# Patient Record
Sex: MALE | Race: WHITE | Employment: OTHER | URBAN - METROPOLITAN AREA
[De-identification: names, ages, dates, MRNs, and addresses within clinical notes are randomized per-mention and may not be internally consistent; named-entity substitution may affect disease eponyms.]

---

## 2022-01-01 ENCOUNTER — APPOINTMENT (OUTPATIENT)
Dept: GENERAL RADIOLOGY | Age: 72
DRG: 871 | End: 2022-01-01
Attending: INTERNAL MEDICINE
Payer: MEDICARE

## 2022-01-01 ENCOUNTER — APPOINTMENT (OUTPATIENT)
Dept: ULTRASOUND IMAGING | Age: 72
DRG: 871 | End: 2022-01-01
Attending: HOSPITALIST
Payer: MEDICARE

## 2022-01-01 ENCOUNTER — APPOINTMENT (OUTPATIENT)
Dept: CT IMAGING | Age: 72
DRG: 871 | End: 2022-01-01
Attending: HOSPITALIST
Payer: MEDICARE

## 2022-01-01 ENCOUNTER — APPOINTMENT (OUTPATIENT)
Dept: CT IMAGING | Age: 72
DRG: 871 | End: 2022-01-01
Attending: NURSE PRACTITIONER
Payer: MEDICARE

## 2022-01-01 ENCOUNTER — APPOINTMENT (OUTPATIENT)
Dept: GENERAL RADIOLOGY | Age: 72
DRG: 871 | End: 2022-01-01
Attending: EMERGENCY MEDICINE
Payer: MEDICARE

## 2022-01-01 ENCOUNTER — HOSPITAL ENCOUNTER (INPATIENT)
Age: 72
LOS: 7 days | DRG: 871 | End: 2022-01-16
Attending: EMERGENCY MEDICINE | Admitting: STUDENT IN AN ORGANIZED HEALTH CARE EDUCATION/TRAINING PROGRAM
Payer: MEDICARE

## 2022-01-01 ENCOUNTER — APPOINTMENT (OUTPATIENT)
Dept: GENERAL RADIOLOGY | Age: 72
DRG: 871 | End: 2022-01-01
Attending: HOSPITALIST
Payer: MEDICARE

## 2022-01-01 ENCOUNTER — APPOINTMENT (OUTPATIENT)
Dept: NON INVASIVE DIAGNOSTICS | Age: 72
DRG: 871 | End: 2022-01-01
Attending: INTERNAL MEDICINE
Payer: MEDICARE

## 2022-01-01 ENCOUNTER — APPOINTMENT (OUTPATIENT)
Dept: VASCULAR SURGERY | Age: 72
DRG: 871 | End: 2022-01-01
Attending: INTERNAL MEDICINE
Payer: MEDICARE

## 2022-01-01 VITALS
HEART RATE: 126 BPM | WEIGHT: 123.02 LBS | HEIGHT: 68 IN | BODY MASS INDEX: 18.64 KG/M2 | SYSTOLIC BLOOD PRESSURE: 89 MMHG | DIASTOLIC BLOOD PRESSURE: 60 MMHG | TEMPERATURE: 95 F | OXYGEN SATURATION: 98 % | RESPIRATION RATE: 24 BRPM

## 2022-01-01 DIAGNOSIS — N28.9 RENAL INSUFFICIENCY: ICD-10-CM

## 2022-01-01 DIAGNOSIS — G93.41 METABOLIC ENCEPHALOPATHY: ICD-10-CM

## 2022-01-01 DIAGNOSIS — A41.9 SEVERE SEPSIS (HCC): Primary | ICD-10-CM

## 2022-01-01 DIAGNOSIS — E86.0 DEHYDRATION: ICD-10-CM

## 2022-01-01 DIAGNOSIS — R65.20 SEVERE SEPSIS (HCC): Primary | ICD-10-CM

## 2022-01-01 DIAGNOSIS — E87.1 HYPONATREMIA: ICD-10-CM

## 2022-01-01 DIAGNOSIS — G93.1 ANOXIC BRAIN INJURY (HCC): ICD-10-CM

## 2022-01-01 DIAGNOSIS — R40.20 COMA, UNSPECIFIED COMA DEPTH (HCC): ICD-10-CM

## 2022-01-01 LAB
ALBUMIN SERPL-MCNC: 1.8 G/DL (ref 3.5–5)
ALBUMIN SERPL-MCNC: 1.9 G/DL (ref 3.5–5)
ALBUMIN SERPL-MCNC: 2.3 G/DL (ref 3.5–5)
ALBUMIN SERPL-MCNC: 2.6 G/DL (ref 3.5–5)
ALBUMIN SERPL-MCNC: 2.6 G/DL (ref 3.5–5)
ALBUMIN SERPL-MCNC: 2.9 G/DL (ref 3.5–5)
ALBUMIN/GLOB SERPL: 0.6 {RATIO} (ref 1.1–2.2)
ALBUMIN/GLOB SERPL: 0.7 {RATIO} (ref 1.1–2.2)
ALBUMIN/GLOB SERPL: 1.2 {RATIO} (ref 1.1–2.2)
ALBUMIN/GLOB SERPL: 1.3 {RATIO} (ref 1.1–2.2)
ALBUMIN/GLOB SERPL: 1.5 {RATIO} (ref 1.1–2.2)
ALBUMIN/GLOB SERPL: 2.2 {RATIO} (ref 1.1–2.2)
ALP SERPL-CCNC: 26 U/L (ref 45–117)
ALP SERPL-CCNC: 28 U/L (ref 45–117)
ALP SERPL-CCNC: 31 U/L (ref 45–117)
ALP SERPL-CCNC: 39 U/L (ref 45–117)
ALP SERPL-CCNC: 46 U/L (ref 45–117)
ALP SERPL-CCNC: 54 U/L (ref 45–117)
ALP SERPL-CCNC: 61 U/L (ref 45–117)
ALP SERPL-CCNC: 76 U/L (ref 45–117)
ALT SERPL-CCNC: 10 U/L (ref 12–78)
ALT SERPL-CCNC: 11 U/L (ref 12–78)
ALT SERPL-CCNC: 11 U/L (ref 12–78)
ALT SERPL-CCNC: 15 U/L (ref 12–78)
ALT SERPL-CCNC: 16 U/L (ref 12–78)
ALT SERPL-CCNC: 8 U/L (ref 12–78)
AMMONIA PLAS-SCNC: 18 UMOL/L
AMMONIA PLAS-SCNC: 44 UMOL/L
ANION GAP SERPL CALC-SCNC: 11 MMOL/L (ref 5–15)
ANION GAP SERPL CALC-SCNC: 13 MMOL/L (ref 5–15)
ANION GAP SERPL CALC-SCNC: 13 MMOL/L (ref 5–15)
ANION GAP SERPL CALC-SCNC: 17 MMOL/L (ref 5–15)
ANION GAP SERPL CALC-SCNC: 18 MMOL/L (ref 5–15)
ANION GAP SERPL CALC-SCNC: 19 MMOL/L (ref 5–15)
ANION GAP SERPL CALC-SCNC: 6 MMOL/L (ref 5–15)
ANION GAP SERPL CALC-SCNC: 7 MMOL/L (ref 5–15)
ANION GAP SERPL CALC-SCNC: 8 MMOL/L (ref 5–15)
APPEARANCE UR: ABNORMAL
APTT PPP: 66.1 SEC (ref 22.1–31)
ARTERIAL PATENCY WRIST A: ABNORMAL
ARTERIAL PATENCY WRIST A: NEGATIVE
AST SERPL-CCNC: 32 U/L (ref 15–37)
AST SERPL-CCNC: 36 U/L (ref 15–37)
AST SERPL-CCNC: 39 U/L (ref 15–37)
AST SERPL-CCNC: 44 U/L (ref 15–37)
AST SERPL-CCNC: 52 U/L (ref 15–37)
AST SERPL-CCNC: 58 U/L (ref 15–37)
AST SERPL-CCNC: 66 U/L (ref 15–37)
AST SERPL-CCNC: 69 U/L (ref 15–37)
BACTERIA SPEC CULT: ABNORMAL
BACTERIA SPEC CULT: ABNORMAL
BACTERIA SPEC CULT: NORMAL
BACTERIA URNS QL MICRO: NEGATIVE /HPF
BASE DEFICIT BLD-SCNC: 12.7 MMOL/L
BASE DEFICIT BLD-SCNC: 14.7 MMOL/L
BASE DEFICIT BLD-SCNC: 15.3 MMOL/L
BASE DEFICIT BLD-SCNC: 6.7 MMOL/L
BASE DEFICIT BLD-SCNC: 7.8 MMOL/L
BASE DEFICIT BLDA-SCNC: 16.3 MMOL/L
BASE DEFICIT BLDA-SCNC: 16.7 MMOL/L
BASE DEFICIT BLDA-SCNC: 17.2 MMOL/L
BASOPHILS # BLD: 0 K/UL (ref 0–0.1)
BASOPHILS NFR BLD: 0 % (ref 0–1)
BDY SITE: ABNORMAL
BILIRUB SERPL-MCNC: 0.4 MG/DL (ref 0.2–1)
BILIRUB SERPL-MCNC: 0.4 MG/DL (ref 0.2–1)
BILIRUB SERPL-MCNC: 0.5 MG/DL (ref 0.2–1)
BILIRUB SERPL-MCNC: 0.6 MG/DL (ref 0.2–1)
BILIRUB SERPL-MCNC: 0.6 MG/DL (ref 0.2–1)
BILIRUB SERPL-MCNC: 0.7 MG/DL (ref 0.2–1)
BILIRUB SERPL-MCNC: 0.8 MG/DL (ref 0.2–1)
BILIRUB SERPL-MCNC: 1.2 MG/DL (ref 0.2–1)
BILIRUB UR QL CFM: NEGATIVE
BUN SERPL-MCNC: 12 MG/DL (ref 6–20)
BUN SERPL-MCNC: 12 MG/DL (ref 6–20)
BUN SERPL-MCNC: 13 MG/DL (ref 6–20)
BUN SERPL-MCNC: 14 MG/DL (ref 6–20)
BUN SERPL-MCNC: 16 MG/DL (ref 6–20)
BUN SERPL-MCNC: 16 MG/DL (ref 6–20)
BUN SERPL-MCNC: 17 MG/DL (ref 6–20)
BUN SERPL-MCNC: 26 MG/DL (ref 6–20)
BUN SERPL-MCNC: 8 MG/DL (ref 6–20)
BUN SERPL-MCNC: 8 MG/DL (ref 6–20)
BUN SERPL-MCNC: 9 MG/DL (ref 6–20)
BUN/CREAT SERPL: 10 (ref 12–20)
BUN/CREAT SERPL: 11 (ref 12–20)
BUN/CREAT SERPL: 12 (ref 12–20)
BUN/CREAT SERPL: 13 (ref 12–20)
BUN/CREAT SERPL: 14 (ref 12–20)
BUN/CREAT SERPL: 19 (ref 12–20)
BUN/CREAT SERPL: 19 (ref 12–20)
BUN/CREAT SERPL: 8 (ref 12–20)
BUN/CREAT SERPL: 9 (ref 12–20)
CA-I BLD-SCNC: 1.1 MMOL/L (ref 1.13–1.32)
CA-I BLD-SCNC: 1.38 MMOL/L (ref 1.12–1.32)
CA-I BLD-SCNC: 1.41 MMOL/L (ref 1.12–1.32)
CA-I BLD-SCNC: 1.78 MMOL/L (ref 1.12–1.32)
CALCIUM SERPL-MCNC: 10.2 MG/DL (ref 8.5–10.1)
CALCIUM SERPL-MCNC: 12.4 MG/DL (ref 8.5–10.1)
CALCIUM SERPL-MCNC: 7 MG/DL (ref 8.5–10.1)
CALCIUM SERPL-MCNC: 7.3 MG/DL (ref 8.5–10.1)
CALCIUM SERPL-MCNC: 7.6 MG/DL (ref 8.5–10.1)
CALCIUM SERPL-MCNC: 7.7 MG/DL (ref 8.5–10.1)
CALCIUM SERPL-MCNC: 7.7 MG/DL (ref 8.5–10.1)
CALCIUM SERPL-MCNC: 7.9 MG/DL (ref 8.5–10.1)
CALCIUM SERPL-MCNC: 7.9 MG/DL (ref 8.5–10.1)
CALCIUM SERPL-MCNC: 8.5 MG/DL (ref 8.5–10.1)
CALCIUM SERPL-MCNC: 9.5 MG/DL (ref 8.5–10.1)
CHLORIDE SERPL-SCNC: 110 MMOL/L (ref 97–108)
CHLORIDE SERPL-SCNC: 111 MMOL/L (ref 97–108)
CHLORIDE SERPL-SCNC: 115 MMOL/L (ref 97–108)
CHLORIDE SERPL-SCNC: 116 MMOL/L (ref 97–108)
CHLORIDE SERPL-SCNC: 117 MMOL/L (ref 97–108)
CHLORIDE SERPL-SCNC: 117 MMOL/L (ref 97–108)
CHLORIDE SERPL-SCNC: 118 MMOL/L (ref 97–108)
CHLORIDE SERPL-SCNC: 119 MMOL/L (ref 97–108)
CHLORIDE SERPL-SCNC: 89 MMOL/L (ref 97–108)
CO2 SERPL-SCNC: 11 MMOL/L (ref 21–32)
CO2 SERPL-SCNC: 13 MMOL/L (ref 21–32)
CO2 SERPL-SCNC: 13 MMOL/L (ref 21–32)
CO2 SERPL-SCNC: 14 MMOL/L (ref 21–32)
CO2 SERPL-SCNC: 14 MMOL/L (ref 21–32)
CO2 SERPL-SCNC: 15 MMOL/L (ref 21–32)
CO2 SERPL-SCNC: 17 MMOL/L (ref 21–32)
CO2 SERPL-SCNC: 18 MMOL/L (ref 21–32)
CO2 SERPL-SCNC: 19 MMOL/L (ref 21–32)
CO2 SERPL-SCNC: 19 MMOL/L (ref 21–32)
CO2 SERPL-SCNC: 22 MMOL/L (ref 21–32)
COLOR UR: ABNORMAL
COMMENT, HOLDF: NORMAL
COMMENT, HOLDF: NORMAL
CORTIS SERPL-MCNC: 27.8 UG/DL
COVID-19 RAPID TEST, COVR: DETECTED
COVID-19 RAPID TEST, COVR: NOT DETECTED
CREAT SERPL-MCNC: 0.63 MG/DL (ref 0.7–1.3)
CREAT SERPL-MCNC: 0.87 MG/DL (ref 0.7–1.3)
CREAT SERPL-MCNC: 0.88 MG/DL (ref 0.7–1.3)
CREAT SERPL-MCNC: 0.92 MG/DL (ref 0.7–1.3)
CREAT SERPL-MCNC: 0.95 MG/DL (ref 0.7–1.3)
CREAT SERPL-MCNC: 0.99 MG/DL (ref 0.7–1.3)
CREAT SERPL-MCNC: 1.03 MG/DL (ref 0.7–1.3)
CREAT SERPL-MCNC: 1.15 MG/DL (ref 0.7–1.3)
CREAT SERPL-MCNC: 1.26 MG/DL (ref 0.7–1.3)
CREAT SERPL-MCNC: 1.42 MG/DL (ref 0.7–1.3)
CREAT SERPL-MCNC: 1.8 MG/DL (ref 0.7–1.3)
DIFFERENTIAL METHOD BLD: ABNORMAL
ECHO AO ROOT DIAM: 2.4 CM
ECHO AO ROOT INDEX: 1.64 CM/M2
ECHO AV AREA PEAK VELOCITY: 0.4 CM2
ECHO AV AREA VTI: 0.3 CM2
ECHO AV MEAN GRADIENT: 26 MMHG
ECHO AV MEAN VELOCITY: 2.5 M/S
ECHO AV PEAK GRADIENT: 35 MMHG
ECHO AV PEAK GRADIENT: 36 MMHG
ECHO AV PEAK VELOCITY: 3 M/S
ECHO AV PEAK VELOCITY: 3 M/S
ECHO AV VTI: 69 CM
ECHO EST RA PRESSURE: 8 MMHG
ECHO LA DIAMETER INDEX: 1.99 CM/M2
ECHO LA DIAMETER: 2.9 CM
ECHO LA TO AORTIC ROOT RATIO: 1.21
ECHO LV E' LATERAL VELOCITY: 3 CM/S
ECHO LV E' SEPTAL VELOCITY: 7 CM/S
ECHO LV FRACTIONAL SHORTENING: 13 % (ref 28–44)
ECHO LV INTERNAL DIMENSION DIASTOLE INDEX: 2.67 CM/M2
ECHO LV INTERNAL DIMENSION DIASTOLIC: 3.9 CM (ref 4.2–5.9)
ECHO LV INTERNAL DIMENSION SYSTOLIC INDEX: 2.33 CM/M2
ECHO LV INTERNAL DIMENSION SYSTOLIC: 3.4 CM
ECHO LV IVSD: 0.6 CM (ref 0.6–1)
ECHO LV MASS 2D: 68.2 G (ref 88–224)
ECHO LV MASS INDEX 2D: 46.7 G/M2 (ref 49–115)
ECHO LV POSTERIOR WALL DIASTOLIC: 0.7 CM (ref 0.6–1)
ECHO LV RELATIVE WALL THICKNESS RATIO: 0.36
ECHO LVOT AREA: 2.3 CM2
ECHO LVOT AV VTI INDEX: 0.11
ECHO LVOT DIAM: 1.7 CM
ECHO LVOT MEAN GRADIENT: 1 MMHG
ECHO LVOT PEAK GRADIENT: 1 MMHG
ECHO LVOT PEAK VELOCITY: 0.5 M/S
ECHO LVOT STROKE VOLUME INDEX: 12.3 ML/M2
ECHO LVOT SV: 17.9 ML
ECHO LVOT VTI: 7.9 CM
ECHO MV A VELOCITY: 0.61 M/S
ECHO MV AREA PHT: 3.3 CM2
ECHO MV E DECELERATION TIME (DT): 231.4 MS
ECHO MV E VELOCITY: 0.75 M/S
ECHO MV E/A RATIO: 1.23
ECHO MV E/E' LATERAL: 25
ECHO MV E/E' RATIO (AVERAGED): 17.86
ECHO MV E/E' SEPTAL: 10.71
ECHO MV PRESSURE HALF TIME (PHT): 67.1 MS
ECHO MV REGURGITANT PEAK GRADIENT: 31 MMHG
ECHO MV REGURGITANT PEAK VELOCITY: 2.8 M/S
ECHO PV MAX VELOCITY: 0.6 M/S
ECHO PV PEAK GRADIENT: 2 MMHG
ECHO RIGHT VENTRICULAR SYSTOLIC PRESSURE (RVSP): 55 MMHG
ECHO TV REGURGITANT MAX VELOCITY: 3.44 M/S
ECHO TV REGURGITANT PEAK GRADIENT: 47 MMHG
EOSINOPHIL # BLD: 0 K/UL (ref 0–0.4)
EOSINOPHIL NFR BLD: 0 % (ref 0–7)
EPITH CASTS URNS QL MICRO: ABNORMAL /LPF
ERYTHROCYTE [DISTWIDTH] IN BLOOD BY AUTOMATED COUNT: 12.3 % (ref 11.5–14.5)
ERYTHROCYTE [DISTWIDTH] IN BLOOD BY AUTOMATED COUNT: 12.5 % (ref 11.5–14.5)
ERYTHROCYTE [DISTWIDTH] IN BLOOD BY AUTOMATED COUNT: 12.5 % (ref 11.5–14.5)
ERYTHROCYTE [DISTWIDTH] IN BLOOD BY AUTOMATED COUNT: 12.6 % (ref 11.5–14.5)
ERYTHROCYTE [DISTWIDTH] IN BLOOD BY AUTOMATED COUNT: 12.8 % (ref 11.5–14.5)
ERYTHROCYTE [DISTWIDTH] IN BLOOD BY AUTOMATED COUNT: 13.1 % (ref 11.5–14.5)
ERYTHROCYTE [DISTWIDTH] IN BLOOD BY AUTOMATED COUNT: 13.2 % (ref 11.5–14.5)
ERYTHROCYTE [DISTWIDTH] IN BLOOD BY AUTOMATED COUNT: 14.1 % (ref 11.5–14.5)
ERYTHROCYTE [DISTWIDTH] IN BLOOD BY AUTOMATED COUNT: 14.8 % (ref 11.5–14.5)
ERYTHROCYTE [DISTWIDTH] IN BLOOD BY AUTOMATED COUNT: 16.1 % (ref 11.5–14.5)
FIO2 ON VENT: 100 %
FIO2 ON VENT: 100 %
FIO2 ON VENT: 80 %
FOLATE SERPL-MCNC: 6.7 NG/ML (ref 5–21)
FOLATE SERPL-MCNC: 9.6 NG/ML (ref 5–21)
GAS FLOW.O2 O2 DELIVERY SYS: ABNORMAL L/MIN
GAS FLOW.O2 SETTING OXYMISER: 16 BPM
GAS FLOW.O2 SETTING OXYMISER: 20 L/MIN
GAS FLOW.O2 SETTING OXYMISER: 24 L/MIN
GAS FLOW.O2 SETTING OXYMISER: 24 L/MIN
GLOBULIN SER CALC-MCNC: 1.2 G/DL (ref 2–4)
GLOBULIN SER CALC-MCNC: 1.5 G/DL (ref 2–4)
GLOBULIN SER CALC-MCNC: 1.5 G/DL (ref 2–4)
GLOBULIN SER CALC-MCNC: 2.1 G/DL (ref 2–4)
GLOBULIN SER CALC-MCNC: 2.6 G/DL (ref 2–4)
GLOBULIN SER CALC-MCNC: 2.6 G/DL (ref 2–4)
GLOBULIN SER CALC-MCNC: 2.7 G/DL (ref 2–4)
GLOBULIN SER CALC-MCNC: 4.7 G/DL (ref 2–4)
GLUCOSE BLD STRIP.AUTO-MCNC: 101 MG/DL (ref 65–117)
GLUCOSE BLD STRIP.AUTO-MCNC: 106 MG/DL (ref 65–117)
GLUCOSE BLD STRIP.AUTO-MCNC: 108 MG/DL (ref 65–117)
GLUCOSE BLD STRIP.AUTO-MCNC: 111 MG/DL (ref 65–117)
GLUCOSE BLD STRIP.AUTO-MCNC: 125 MG/DL (ref 65–117)
GLUCOSE BLD STRIP.AUTO-MCNC: 127 MG/DL (ref 65–117)
GLUCOSE BLD STRIP.AUTO-MCNC: 146 MG/DL (ref 65–117)
GLUCOSE BLD STRIP.AUTO-MCNC: 147 MG/DL (ref 65–117)
GLUCOSE BLD STRIP.AUTO-MCNC: 147 MG/DL (ref 65–117)
GLUCOSE BLD STRIP.AUTO-MCNC: 148 MG/DL (ref 65–117)
GLUCOSE BLD STRIP.AUTO-MCNC: 156 MG/DL (ref 65–117)
GLUCOSE BLD STRIP.AUTO-MCNC: 165 MG/DL (ref 65–117)
GLUCOSE BLD STRIP.AUTO-MCNC: 176 MG/DL (ref 65–117)
GLUCOSE BLD STRIP.AUTO-MCNC: 179 MG/DL (ref 65–117)
GLUCOSE BLD STRIP.AUTO-MCNC: 181 MG/DL (ref 65–117)
GLUCOSE BLD STRIP.AUTO-MCNC: 234 MG/DL (ref 65–117)
GLUCOSE BLD STRIP.AUTO-MCNC: 59 MG/DL (ref 65–117)
GLUCOSE BLD STRIP.AUTO-MCNC: 68 MG/DL (ref 65–117)
GLUCOSE BLD STRIP.AUTO-MCNC: 81 MG/DL (ref 65–117)
GLUCOSE SERPL-MCNC: 111 MG/DL (ref 65–100)
GLUCOSE SERPL-MCNC: 142 MG/DL (ref 65–100)
GLUCOSE SERPL-MCNC: 142 MG/DL (ref 65–100)
GLUCOSE SERPL-MCNC: 149 MG/DL (ref 65–100)
GLUCOSE SERPL-MCNC: 157 MG/DL (ref 65–100)
GLUCOSE SERPL-MCNC: 178 MG/DL (ref 65–100)
GLUCOSE SERPL-MCNC: 195 MG/DL (ref 65–100)
GLUCOSE SERPL-MCNC: 209 MG/DL (ref 65–100)
GLUCOSE SERPL-MCNC: 360 MG/DL (ref 65–100)
GLUCOSE SERPL-MCNC: 61 MG/DL (ref 65–100)
GLUCOSE SERPL-MCNC: 63 MG/DL (ref 65–100)
GLUCOSE UR STRIP.AUTO-MCNC: NEGATIVE MG/DL
GRAM STN SPEC: ABNORMAL
HCO3 BLD-SCNC: 13.2 MMOL/L (ref 22–26)
HCO3 BLD-SCNC: 13.4 MMOL/L (ref 22–26)
HCO3 BLD-SCNC: 15 MMOL/L (ref 22–26)
HCO3 BLD-SCNC: 17.5 MMOL/L (ref 22–26)
HCO3 BLD-SCNC: 18.5 MMOL/L (ref 22–26)
HCO3 BLDA-SCNC: 11 MMOL/L (ref 22–26)
HCT VFR BLD AUTO: 19.6 % (ref 36.6–50.3)
HCT VFR BLD AUTO: 19.6 % (ref 36.6–50.3)
HCT VFR BLD AUTO: 21 % (ref 36.6–50.3)
HCT VFR BLD AUTO: 23 % (ref 36.6–50.3)
HCT VFR BLD AUTO: 23 % (ref 36.6–50.3)
HCT VFR BLD AUTO: 24.5 % (ref 36.6–50.3)
HCT VFR BLD AUTO: 25.4 % (ref 36.6–50.3)
HCT VFR BLD AUTO: 25.5 % (ref 36.6–50.3)
HCT VFR BLD AUTO: 26 % (ref 36.6–50.3)
HCT VFR BLD AUTO: 26.1 % (ref 36.6–50.3)
HCT VFR BLD AUTO: 26.4 % (ref 36.6–50.3)
HCT VFR BLD AUTO: 26.9 % (ref 36.6–50.3)
HCT VFR BLD AUTO: 27.5 % (ref 36.6–50.3)
HCT VFR BLD AUTO: 33.8 % (ref 36.6–50.3)
HEMOCCULT STL QL: NEGATIVE
HGB BLD-MCNC: 11.4 G/DL (ref 12.1–17)
HGB BLD-MCNC: 6 G/DL (ref 12.1–17)
HGB BLD-MCNC: 6.2 G/DL (ref 12.1–17)
HGB BLD-MCNC: 6.4 G/DL (ref 12.1–17)
HGB BLD-MCNC: 7 G/DL (ref 12.1–17)
HGB BLD-MCNC: 7.6 G/DL (ref 12.1–17)
HGB BLD-MCNC: 7.8 G/DL (ref 12.1–17)
HGB BLD-MCNC: 7.9 G/DL (ref 12.1–17)
HGB BLD-MCNC: 8.1 G/DL (ref 12.1–17)
HGB BLD-MCNC: 8.1 G/DL (ref 12.1–17)
HGB BLD-MCNC: 8.2 G/DL (ref 12.1–17)
HGB BLD-MCNC: 8.3 G/DL (ref 12.1–17)
HGB BLD-MCNC: 8.4 G/DL (ref 12.1–17)
HGB BLD-MCNC: 8.6 G/DL (ref 12.1–17)
HGB UR QL STRIP: ABNORMAL
HISTORY CHECKED?,CKHIST: NORMAL
IMM GRANULOCYTES # BLD AUTO: 0 K/UL
IMM GRANULOCYTES # BLD AUTO: 0 K/UL
IMM GRANULOCYTES # BLD AUTO: 0.1 K/UL (ref 0–0.04)
IMM GRANULOCYTES NFR BLD AUTO: 0 %
IMM GRANULOCYTES NFR BLD AUTO: 0 %
IMM GRANULOCYTES NFR BLD AUTO: 1 % (ref 0–0.5)
IMM GRANULOCYTES NFR BLD AUTO: 2 % (ref 0–0.5)
INR PPP: 1.5 (ref 0.9–1.1)
IRON SATN MFR SERPL: 36 % (ref 20–50)
IRON SERPL-MCNC: 49 UG/DL (ref 35–150)
KETONES UR QL STRIP.AUTO: ABNORMAL MG/DL
LACTATE SERPL-SCNC: 1.2 MMOL/L (ref 0.4–2)
LACTATE SERPL-SCNC: 11.2 MMOL/L (ref 0.4–2)
LACTATE SERPL-SCNC: 15.6 MMOL/L (ref 0.4–2)
LACTATE SERPL-SCNC: 17.3 MMOL/L (ref 0.4–2)
LACTATE SERPL-SCNC: 2.4 MMOL/L (ref 0.4–2)
LACTATE SERPL-SCNC: 2.8 MMOL/L (ref 0.4–2)
LACTATE SERPL-SCNC: 5.2 MMOL/L (ref 0.4–2)
LACTATE SERPL-SCNC: 6.1 MMOL/L (ref 0.4–2)
LACTATE SERPL-SCNC: 6.3 MMOL/L (ref 0.4–2)
LACTATE SERPL-SCNC: 7.8 MMOL/L (ref 0.4–2)
LEUKOCYTE ESTERASE UR QL STRIP.AUTO: ABNORMAL
LIPASE SERPL-CCNC: <10 U/L (ref 73–393)
LYMPHOCYTES # BLD: 0.4 K/UL (ref 0.8–3.5)
LYMPHOCYTES # BLD: 0.4 K/UL (ref 0.8–3.5)
LYMPHOCYTES # BLD: 0.5 K/UL (ref 0.8–3.5)
LYMPHOCYTES # BLD: 0.6 K/UL (ref 0.8–3.5)
LYMPHOCYTES # BLD: 1.1 K/UL (ref 0.8–3.5)
LYMPHOCYTES # BLD: 1.9 K/UL (ref 0.8–3.5)
LYMPHOCYTES NFR BLD: 11 % (ref 12–49)
LYMPHOCYTES NFR BLD: 12 % (ref 12–49)
LYMPHOCYTES NFR BLD: 14 % (ref 12–49)
LYMPHOCYTES NFR BLD: 18 % (ref 12–49)
LYMPHOCYTES NFR BLD: 19 % (ref 12–49)
LYMPHOCYTES NFR BLD: 7 % (ref 12–49)
MAGNESIUM SERPL-MCNC: 1.1 MG/DL (ref 1.6–2.4)
MAGNESIUM SERPL-MCNC: 1.2 MG/DL (ref 1.6–2.4)
MAGNESIUM SERPL-MCNC: 1.4 MG/DL (ref 1.6–2.4)
MAGNESIUM SERPL-MCNC: 1.6 MG/DL (ref 1.6–2.4)
MAGNESIUM SERPL-MCNC: 1.7 MG/DL (ref 1.6–2.4)
MAGNESIUM SERPL-MCNC: 2.5 MG/DL (ref 1.6–2.4)
MCH RBC QN AUTO: 32.3 PG (ref 26–34)
MCH RBC QN AUTO: 32.7 PG (ref 26–34)
MCH RBC QN AUTO: 32.8 PG (ref 26–34)
MCH RBC QN AUTO: 32.8 PG (ref 26–34)
MCH RBC QN AUTO: 33 PG (ref 26–34)
MCH RBC QN AUTO: 33.1 PG (ref 26–34)
MCH RBC QN AUTO: 33.1 PG (ref 26–34)
MCH RBC QN AUTO: 33.2 PG (ref 26–34)
MCH RBC QN AUTO: 33.3 PG (ref 26–34)
MCH RBC QN AUTO: 33.5 PG (ref 26–34)
MCHC RBC AUTO-ENTMCNC: 30.4 G/DL (ref 30–36.5)
MCHC RBC AUTO-ENTMCNC: 30.6 G/DL (ref 30–36.5)
MCHC RBC AUTO-ENTMCNC: 31.2 G/DL (ref 30–36.5)
MCHC RBC AUTO-ENTMCNC: 31.2 G/DL (ref 30–36.5)
MCHC RBC AUTO-ENTMCNC: 31.4 G/DL (ref 30–36.5)
MCHC RBC AUTO-ENTMCNC: 31.4 G/DL (ref 30–36.5)
MCHC RBC AUTO-ENTMCNC: 31.6 G/DL (ref 30–36.5)
MCHC RBC AUTO-ENTMCNC: 31.8 G/DL (ref 30–36.5)
MCHC RBC AUTO-ENTMCNC: 33 G/DL (ref 30–36.5)
MCHC RBC AUTO-ENTMCNC: 33.7 G/DL (ref 30–36.5)
MCV RBC AUTO: 101.3 FL (ref 80–99)
MCV RBC AUTO: 103.5 FL (ref 80–99)
MCV RBC AUTO: 103.7 FL (ref 80–99)
MCV RBC AUTO: 103.9 FL (ref 80–99)
MCV RBC AUTO: 104.5 FL (ref 80–99)
MCV RBC AUTO: 105.2 FL (ref 80–99)
MCV RBC AUTO: 106.1 FL (ref 80–99)
MCV RBC AUTO: 107.1 FL (ref 80–99)
MCV RBC AUTO: 108.5 FL (ref 80–99)
MCV RBC AUTO: 98.8 FL (ref 80–99)
MONOCYTES # BLD: 0.1 K/UL (ref 0–1)
MONOCYTES # BLD: 0.2 K/UL (ref 0–1)
MONOCYTES # BLD: 0.5 K/UL (ref 0–1)
MONOCYTES # BLD: 0.5 K/UL (ref 0–1)
MONOCYTES NFR BLD: 2 % (ref 5–13)
MONOCYTES NFR BLD: 4 % (ref 5–13)
MONOCYTES NFR BLD: 6 % (ref 5–13)
MONOCYTES NFR BLD: 6 % (ref 5–13)
MONOCYTES NFR BLD: 7 % (ref 5–13)
MONOCYTES NFR BLD: 7 % (ref 5–13)
MYELOCYTES NFR BLD MANUAL: 1 %
NEUTS BAND NFR BLD MANUAL: 3 % (ref 0–6)
NEUTS SEG # BLD: 1.8 K/UL (ref 1.8–8)
NEUTS SEG # BLD: 2.6 K/UL (ref 1.8–8)
NEUTS SEG # BLD: 24.6 K/UL (ref 1.8–8)
NEUTS SEG # BLD: 3 K/UL (ref 1.8–8)
NEUTS SEG # BLD: 3.2 K/UL (ref 1.8–8)
NEUTS SEG # BLD: 6.1 K/UL (ref 1.8–8)
NEUTS SEG NFR BLD: 72 % (ref 32–75)
NEUTS SEG NFR BLD: 74 % (ref 32–75)
NEUTS SEG NFR BLD: 78 % (ref 32–75)
NEUTS SEG NFR BLD: 80 % (ref 32–75)
NEUTS SEG NFR BLD: 85 % (ref 32–75)
NEUTS SEG NFR BLD: 87 % (ref 32–75)
NITRITE UR QL STRIP.AUTO: NEGATIVE
NRBC # BLD: 0 K/UL (ref 0–0.01)
NRBC # BLD: 0.02 K/UL (ref 0–0.01)
NRBC # BLD: 0.11 K/UL (ref 0–0.01)
NRBC # BLD: 0.11 K/UL (ref 0–0.01)
NRBC # BLD: 0.14 K/UL (ref 0–0.01)
NRBC # BLD: 0.43 K/UL (ref 0–0.01)
NRBC # BLD: 0.63 K/UL (ref 0–0.01)
NRBC BLD-RTO: 0 PER 100 WBC
NRBC BLD-RTO: 0.4 PER 100 WBC
NRBC BLD-RTO: 0.6 PER 100 WBC
NRBC BLD-RTO: 0.7 PER 100 WBC
NRBC BLD-RTO: 0.9 PER 100 WBC
NRBC BLD-RTO: 1.3 PER 100 WBC
NRBC BLD-RTO: 2.3 PER 100 WBC
O2/TOTAL GAS SETTING VFR VENT: 100 %
O2/TOTAL GAS SETTING VFR VENT: 100 %
O2/TOTAL GAS SETTING VFR VENT: 60 %
PCO2 BLD: 33.6 MMHG (ref 35–45)
PCO2 BLD: 34.5 MMHG (ref 35–45)
PCO2 BLD: 39.5 MMHG (ref 35–45)
PCO2 BLD: 41.9 MMHG (ref 35–45)
PCO2 BLD: 42.2 MMHG (ref 35–45)
PCO2 BLDA: 32 MMHG (ref 35–45)
PCO2 BLDA: 34 MMHG (ref 35–45)
PCO2 BLDA: 34 MMHG (ref 35–45)
PEEP RESPIRATORY: 5 CMH2O
PEEP RESPIRATORY: 8 CMH2O
PEEP RESPIRATORY: 8 CM[H2O]
PH BLD: 7.11 [PH] (ref 7.35–7.45)
PH BLD: 7.14 [PH] (ref 7.35–7.45)
PH BLD: 7.16 [PH] (ref 7.35–7.45)
PH BLD: 7.33 [PH] (ref 7.35–7.45)
PH BLD: 7.34 [PH] (ref 7.35–7.45)
PH BLDA: 7.13 [PH] (ref 7.35–7.45)
PH BLDA: 7.15 [PH] (ref 7.35–7.45)
PH BLDA: 7.15 [PH] (ref 7.35–7.45)
PH UR STRIP: 5 [PH] (ref 5–8)
PH UR STRIP: 6 [PH] (ref 5–8)
PHOSPHATE SERPL-MCNC: 1.2 MG/DL (ref 2.6–4.7)
PHOSPHATE SERPL-MCNC: 2 MG/DL (ref 2.6–4.7)
PHOSPHATE SERPL-MCNC: 5.2 MG/DL (ref 2.6–4.7)
PLATELET # BLD AUTO: 102 K/UL (ref 150–400)
PLATELET # BLD AUTO: 147 K/UL (ref 150–400)
PLATELET # BLD AUTO: 23 K/UL (ref 150–400)
PLATELET # BLD AUTO: 36 K/UL (ref 150–400)
PLATELET # BLD AUTO: 48 K/UL (ref 150–400)
PLATELET # BLD AUTO: 65 K/UL (ref 150–400)
PLATELET # BLD AUTO: 72 K/UL (ref 150–400)
PLATELET # BLD AUTO: 80 K/UL (ref 150–400)
PLATELET # BLD AUTO: 92 K/UL (ref 150–400)
PLATELET # BLD AUTO: 92 K/UL (ref 150–400)
PLATELET COMMENTS,PCOM: ABNORMAL
PMV BLD AUTO: 11.4 FL (ref 8.9–12.9)
PMV BLD AUTO: 11.8 FL (ref 8.9–12.9)
PMV BLD AUTO: 11.9 FL (ref 8.9–12.9)
PMV BLD AUTO: 12.3 FL (ref 8.9–12.9)
PMV BLD AUTO: 12.9 FL (ref 8.9–12.9)
PO2 BLD: 127 MMHG (ref 80–100)
PO2 BLD: 465 MMHG (ref 80–100)
PO2 BLD: 54 MMHG (ref 80–100)
PO2 BLD: 62 MMHG (ref 80–100)
PO2 BLD: 86 MMHG (ref 80–100)
PO2 BLDA: 115 MMHG (ref 80–100)
PO2 BLDA: 124 MMHG (ref 80–100)
PO2 BLDA: 165 MMHG (ref 80–100)
POTASSIUM SERPL-SCNC: 2.9 MMOL/L (ref 3.5–5.1)
POTASSIUM SERPL-SCNC: 3.1 MMOL/L (ref 3.5–5.1)
POTASSIUM SERPL-SCNC: 3.6 MMOL/L (ref 3.5–5.1)
POTASSIUM SERPL-SCNC: 3.7 MMOL/L (ref 3.5–5.1)
POTASSIUM SERPL-SCNC: 4 MMOL/L (ref 3.5–5.1)
POTASSIUM SERPL-SCNC: 4.1 MMOL/L (ref 3.5–5.1)
POTASSIUM SERPL-SCNC: 4.5 MMOL/L (ref 3.5–5.1)
POTASSIUM SERPL-SCNC: 4.7 MMOL/L (ref 3.5–5.1)
POTASSIUM SERPL-SCNC: 5 MMOL/L (ref 3.5–5.1)
PROCALCITONIN SERPL-MCNC: 0.94 NG/ML
PROT SERPL-MCNC: 3.4 G/DL (ref 6.4–8.2)
PROT SERPL-MCNC: 3.8 G/DL (ref 6.4–8.2)
PROT SERPL-MCNC: 3.8 G/DL (ref 6.4–8.2)
PROT SERPL-MCNC: 4.4 G/DL (ref 6.4–8.2)
PROT SERPL-MCNC: 4.5 G/DL (ref 6.4–8.2)
PROT SERPL-MCNC: 4.6 G/DL (ref 6.4–8.2)
PROT SERPL-MCNC: 4.7 G/DL (ref 6.4–8.2)
PROT SERPL-MCNC: 7.6 G/DL (ref 6.4–8.2)
PROT UR STRIP-MCNC: ABNORMAL MG/DL
PROTHROMBIN TIME: 15.2 SEC (ref 9–11.1)
RBC # BLD AUTO: 1.83 M/UL (ref 4.1–5.7)
RBC # BLD AUTO: 1.89 M/UL (ref 4.1–5.7)
RBC # BLD AUTO: 2.12 M/UL (ref 4.1–5.7)
RBC # BLD AUTO: 2.27 M/UL (ref 4.1–5.7)
RBC # BLD AUTO: 2.44 M/UL (ref 4.1–5.7)
RBC # BLD AUTO: 2.45 M/UL (ref 4.1–5.7)
RBC # BLD AUTO: 2.48 M/UL (ref 4.1–5.7)
RBC # BLD AUTO: 2.54 M/UL (ref 4.1–5.7)
RBC # BLD AUTO: 2.6 M/UL (ref 4.1–5.7)
RBC # BLD AUTO: 3.42 M/UL (ref 4.1–5.7)
RBC #/AREA URNS HPF: ABNORMAL /HPF (ref 0–5)
RBC MORPH BLD: ABNORMAL
SAMPLES BEING HELD,HOLD: NORMAL
SAMPLES BEING HELD,HOLD: NORMAL
SAO2 % BLD: 100 % (ref 92–97)
SAO2 % BLD: 78.3 % (ref 92–97)
SAO2 % BLD: 81.6 % (ref 92–97)
SAO2 % BLD: 92.9 % (ref 92–97)
SAO2 % BLD: 97 % (ref 92–97)
SAO2 % BLD: 98 % (ref 92–97)
SAO2 % BLD: 98.7 % (ref 92–97)
SAO2 % BLD: 99 % (ref 92–97)
SAO2% DEVICE SAO2% SENSOR NAME: ABNORMAL
SARS-COV-2, COV2: NORMAL
SARS-COV-2, XPLCVT: DETECTED
SERVICE CMNT-IMP: ABNORMAL
SERVICE CMNT-IMP: NORMAL
SODIUM SERPL-SCNC: 127 MMOL/L (ref 136–145)
SODIUM SERPL-SCNC: 139 MMOL/L (ref 136–145)
SODIUM SERPL-SCNC: 139 MMOL/L (ref 136–145)
SODIUM SERPL-SCNC: 141 MMOL/L (ref 136–145)
SODIUM SERPL-SCNC: 141 MMOL/L (ref 136–145)
SODIUM SERPL-SCNC: 142 MMOL/L (ref 136–145)
SODIUM SERPL-SCNC: 142 MMOL/L (ref 136–145)
SODIUM SERPL-SCNC: 143 MMOL/L (ref 136–145)
SODIUM SERPL-SCNC: 145 MMOL/L (ref 136–145)
SOURCE, COVRS: ABNORMAL
SOURCE, COVRS: ABNORMAL
SOURCE, COVRS: NORMAL
SP GR UR REFRACTOMETRY: 1.02 (ref 1–1.03)
SPECIMEN SITE: ABNORMAL
SPECIMEN TYPE: ABNORMAL
T4 FREE SERPL-MCNC: 1 NG/DL (ref 0.8–1.5)
THERAPEUTIC RANGE,PTTT: ABNORMAL SECS (ref 58–77)
TIBC SERPL-MCNC: 137 UG/DL (ref 250–450)
TROPONIN-HIGH SENSITIVITY: 1182 NG/L (ref 0–76)
TROPONIN-HIGH SENSITIVITY: 121 NG/L (ref 0–76)
TROPONIN-HIGH SENSITIVITY: 1815 NG/L (ref 0–76)
TROPONIN-HIGH SENSITIVITY: 1955 NG/L (ref 0–76)
TSH SERPL DL<=0.05 MIU/L-ACNC: 4.19 UIU/ML (ref 0.36–3.74)
UROBILINOGEN UR QL STRIP.AUTO: 1 EU/DL (ref 0.2–1)
VANCOMYCIN SERPL-MCNC: 11.4 UG/ML
VENTILATION MODE VENT: ABNORMAL
VIT B12 SERPL-MCNC: 1030 PG/ML (ref 193–986)
VT SETTING VENT: 480 ML
WBC # BLD AUTO: 15.8 K/UL (ref 4.1–11.1)
WBC # BLD AUTO: 15.9 K/UL (ref 4.1–11.1)
WBC # BLD AUTO: 2.6 K/UL (ref 4.1–11.1)
WBC # BLD AUTO: 27 K/UL (ref 4.1–11.1)
WBC # BLD AUTO: 27.3 K/UL (ref 4.1–11.1)
WBC # BLD AUTO: 3.5 K/UL (ref 4.1–11.1)
WBC # BLD AUTO: 3.7 K/UL (ref 4.1–11.1)
WBC # BLD AUTO: 3.7 K/UL (ref 4.1–11.1)
WBC # BLD AUTO: 32.2 K/UL (ref 4.1–11.1)
WBC # BLD AUTO: 7.8 K/UL (ref 4.1–11.1)
WBC URNS QL MICRO: ABNORMAL /HPF (ref 0–4)

## 2022-01-01 PROCEDURE — 96360 HYDRATION IV INFUSION INIT: CPT

## 2022-01-01 PROCEDURE — 74011250636 HC RX REV CODE- 250/636: Performed by: INTERNAL MEDICINE

## 2022-01-01 PROCEDURE — 74011000250 HC RX REV CODE- 250: Performed by: INTERNAL MEDICINE

## 2022-01-01 PROCEDURE — 36415 COLL VENOUS BLD VENIPUNCTURE: CPT

## 2022-01-01 PROCEDURE — 84484 ASSAY OF TROPONIN QUANT: CPT

## 2022-01-01 PROCEDURE — 87070 CULTURE OTHR SPECIMN AEROBIC: CPT

## 2022-01-01 PROCEDURE — C9113 INJ PANTOPRAZOLE SODIUM, VIA: HCPCS | Performed by: INTERNAL MEDICINE

## 2022-01-01 PROCEDURE — 82962 GLUCOSE BLOOD TEST: CPT

## 2022-01-01 PROCEDURE — 83735 ASSAY OF MAGNESIUM: CPT

## 2022-01-01 PROCEDURE — 74011250636 HC RX REV CODE- 250/636

## 2022-01-01 PROCEDURE — 65660000000 HC RM CCU STEPDOWN

## 2022-01-01 PROCEDURE — 84100 ASSAY OF PHOSPHORUS: CPT

## 2022-01-01 PROCEDURE — 82140 ASSAY OF AMMONIA: CPT

## 2022-01-01 PROCEDURE — 83540 ASSAY OF IRON: CPT

## 2022-01-01 PROCEDURE — 77030040922 HC BLNKT HYPOTHRM STRY -A

## 2022-01-01 PROCEDURE — 99222 1ST HOSP IP/OBS MODERATE 55: CPT | Performed by: PSYCHIATRY & NEUROLOGY

## 2022-01-01 PROCEDURE — 74011000258 HC RX REV CODE- 258: Performed by: INTERNAL MEDICINE

## 2022-01-01 PROCEDURE — 87449 NOS EACH ORGANISM AG IA: CPT

## 2022-01-01 PROCEDURE — 71045 X-RAY EXAM CHEST 1 VIEW: CPT

## 2022-01-01 PROCEDURE — 96365 THER/PROPH/DIAG IV INF INIT: CPT

## 2022-01-01 PROCEDURE — 03HY32Z INSERTION OF MONITORING DEVICE INTO UPPER ARTERY, PERCUTANEOUS APPROACH: ICD-10-PCS | Performed by: INTERNAL MEDICINE

## 2022-01-01 PROCEDURE — 93306 TTE W/DOPPLER COMPLETE: CPT

## 2022-01-01 PROCEDURE — 80053 COMPREHEN METABOLIC PANEL: CPT

## 2022-01-01 PROCEDURE — 82803 BLOOD GASES ANY COMBINATION: CPT

## 2022-01-01 PROCEDURE — 94003 VENT MGMT INPAT SUBQ DAY: CPT

## 2022-01-01 PROCEDURE — 36430 TRANSFUSION BLD/BLD COMPNT: CPT

## 2022-01-01 PROCEDURE — 74011250637 HC RX REV CODE- 250/637: Performed by: NURSE PRACTITIONER

## 2022-01-01 PROCEDURE — 82746 ASSAY OF FOLIC ACID SERUM: CPT

## 2022-01-01 PROCEDURE — 83605 ASSAY OF LACTIC ACID: CPT

## 2022-01-01 PROCEDURE — P9047 ALBUMIN (HUMAN), 25%, 50ML: HCPCS | Performed by: INTERNAL MEDICINE

## 2022-01-01 PROCEDURE — 94640 AIRWAY INHALATION TREATMENT: CPT

## 2022-01-01 PROCEDURE — 74011250636 HC RX REV CODE- 250/636: Performed by: EMERGENCY MEDICINE

## 2022-01-01 PROCEDURE — 85018 HEMOGLOBIN: CPT

## 2022-01-01 PROCEDURE — 87040 BLOOD CULTURE FOR BACTERIA: CPT

## 2022-01-01 PROCEDURE — 74011250637 HC RX REV CODE- 250/637: Performed by: HOSPITALIST

## 2022-01-01 PROCEDURE — 87635 SARS-COV-2 COVID-19 AMP PRB: CPT

## 2022-01-01 PROCEDURE — 0BH17EZ INSERTION OF ENDOTRACHEAL AIRWAY INTO TRACHEA, VIA NATURAL OR ARTIFICIAL OPENING: ICD-10-PCS | Performed by: INTERNAL MEDICINE

## 2022-01-01 PROCEDURE — 83986 ASSAY PH BODY FLUID NOS: CPT

## 2022-01-01 PROCEDURE — 87086 URINE CULTURE/COLONY COUNT: CPT

## 2022-01-01 PROCEDURE — 80202 ASSAY OF VANCOMYCIN: CPT

## 2022-01-01 PROCEDURE — 36600 WITHDRAWAL OF ARTERIAL BLOOD: CPT

## 2022-01-01 PROCEDURE — 97530 THERAPEUTIC ACTIVITIES: CPT

## 2022-01-01 PROCEDURE — 74011000258 HC RX REV CODE- 258: Performed by: HOSPITALIST

## 2022-01-01 PROCEDURE — 74011250636 HC RX REV CODE- 250/636: Performed by: STUDENT IN AN ORGANIZED HEALTH CARE EDUCATION/TRAINING PROGRAM

## 2022-01-01 PROCEDURE — 36591 DRAW BLOOD OFF VENOUS DEVICE: CPT

## 2022-01-01 PROCEDURE — P9016 RBC LEUKOCYTES REDUCED: HCPCS

## 2022-01-01 PROCEDURE — 82272 OCCULT BLD FECES 1-3 TESTS: CPT

## 2022-01-01 PROCEDURE — 85025 COMPLETE CBC W/AUTO DIFF WBC: CPT

## 2022-01-01 PROCEDURE — 99233 SBSQ HOSP IP/OBS HIGH 50: CPT | Performed by: PSYCHIATRY & NEUROLOGY

## 2022-01-01 PROCEDURE — 86923 COMPATIBILITY TEST ELECTRIC: CPT

## 2022-01-01 PROCEDURE — 74011250636 HC RX REV CODE- 250/636: Performed by: HOSPITALIST

## 2022-01-01 PROCEDURE — 74011250637 HC RX REV CODE- 250/637: Performed by: STUDENT IN AN ORGANIZED HEALTH CARE EDUCATION/TRAINING PROGRAM

## 2022-01-01 PROCEDURE — 83690 ASSAY OF LIPASE: CPT

## 2022-01-01 PROCEDURE — 65620000000 HC RM CCU GENERAL

## 2022-01-01 PROCEDURE — 96361 HYDRATE IV INFUSION ADD-ON: CPT

## 2022-01-01 PROCEDURE — 85730 THROMBOPLASTIN TIME PARTIAL: CPT

## 2022-01-01 PROCEDURE — 65270000029 HC RM PRIVATE

## 2022-01-01 PROCEDURE — 97162 PT EVAL MOD COMPLEX 30 MIN: CPT

## 2022-01-01 PROCEDURE — 65610000003 HC RM ICU SURGICAL

## 2022-01-01 PROCEDURE — 74011250637 HC RX REV CODE- 250/637: Performed by: INTERNAL MEDICINE

## 2022-01-01 PROCEDURE — 82607 VITAMIN B-12: CPT

## 2022-01-01 PROCEDURE — 84443 ASSAY THYROID STIM HORMONE: CPT

## 2022-01-01 PROCEDURE — P9045 ALBUMIN (HUMAN), 5%, 250 ML: HCPCS

## 2022-01-01 PROCEDURE — 85027 COMPLETE CBC AUTOMATED: CPT

## 2022-01-01 PROCEDURE — 80048 BASIC METABOLIC PNL TOTAL CA: CPT

## 2022-01-01 PROCEDURE — 31500 INSERT EMERGENCY AIRWAY: CPT

## 2022-01-01 PROCEDURE — 85610 PROTHROMBIN TIME: CPT

## 2022-01-01 PROCEDURE — 02HV33Z INSERTION OF INFUSION DEVICE INTO SUPERIOR VENA CAVA, PERCUTANEOUS APPROACH: ICD-10-PCS | Performed by: INTERNAL MEDICINE

## 2022-01-01 PROCEDURE — 51798 US URINE CAPACITY MEASURE: CPT

## 2022-01-01 PROCEDURE — 86900 BLOOD TYPING SEROLOGIC ABO: CPT

## 2022-01-01 PROCEDURE — 5A1945Z RESPIRATORY VENTILATION, 24-96 CONSECUTIVE HOURS: ICD-10-PCS | Performed by: INTERNAL MEDICINE

## 2022-01-01 PROCEDURE — 77030037878 HC DRSG MEPILEX >48IN BORD MOLN -B

## 2022-01-01 PROCEDURE — 94002 VENT MGMT INPAT INIT DAY: CPT

## 2022-01-01 PROCEDURE — 82533 TOTAL CORTISOL: CPT

## 2022-01-01 PROCEDURE — U0005 INFEC AGEN DETEC AMPLI PROBE: HCPCS

## 2022-01-01 PROCEDURE — 93970 EXTREMITY STUDY: CPT

## 2022-01-01 PROCEDURE — 97166 OT EVAL MOD COMPLEX 45 MIN: CPT

## 2022-01-01 PROCEDURE — P9047 ALBUMIN (HUMAN), 25%, 50ML: HCPCS | Performed by: HOSPITALIST

## 2022-01-01 PROCEDURE — 74011000250 HC RX REV CODE- 250: Performed by: STUDENT IN AN ORGANIZED HEALTH CARE EDUCATION/TRAINING PROGRAM

## 2022-01-01 PROCEDURE — 99284 EMERGENCY DEPT VISIT MOD MDM: CPT

## 2022-01-01 PROCEDURE — 70450 CT HEAD/BRAIN W/O DYE: CPT

## 2022-01-01 PROCEDURE — 96366 THER/PROPH/DIAG IV INF ADDON: CPT

## 2022-01-01 PROCEDURE — 97535 SELF CARE MNGMENT TRAINING: CPT

## 2022-01-01 PROCEDURE — 36592 COLLECT BLOOD FROM PICC: CPT

## 2022-01-01 PROCEDURE — 74011000258 HC RX REV CODE- 258: Performed by: STUDENT IN AN ORGANIZED HEALTH CARE EDUCATION/TRAINING PROGRAM

## 2022-01-01 PROCEDURE — 74011636637 HC RX REV CODE- 636/637: Performed by: INTERNAL MEDICINE

## 2022-01-01 PROCEDURE — 74011000250 HC RX REV CODE- 250

## 2022-01-01 PROCEDURE — 84439 ASSAY OF FREE THYROXINE: CPT

## 2022-01-01 PROCEDURE — 71250 CT THORAX DX C-: CPT

## 2022-01-01 PROCEDURE — 84145 PROCALCITONIN (PCT): CPT

## 2022-01-01 PROCEDURE — 76705 ECHO EXAM OF ABDOMEN: CPT

## 2022-01-01 PROCEDURE — 81001 URINALYSIS AUTO W/SCOPE: CPT

## 2022-01-01 PROCEDURE — 74018 RADEX ABDOMEN 1 VIEW: CPT

## 2022-01-01 PROCEDURE — 74011000250 HC RX REV CODE- 250: Performed by: HOSPITALIST

## 2022-01-01 PROCEDURE — APPNB60 APP NON BILLABLE TIME 46-60 MINS: Performed by: NURSE PRACTITIONER

## 2022-01-01 RX ORDER — DEXTROSE 50 % IN WATER (D50W) INTRAVENOUS SYRINGE
12.5-25 AS NEEDED
Status: DISCONTINUED | OUTPATIENT
Start: 2022-01-01 | End: 2022-01-01

## 2022-01-01 RX ORDER — SODIUM CHLORIDE 9 MG/ML
50 INJECTION, SOLUTION INTRAVENOUS CONTINUOUS
Status: DISCONTINUED | OUTPATIENT
Start: 2022-01-01 | End: 2022-01-01

## 2022-01-01 RX ORDER — IPRATROPIUM BROMIDE AND ALBUTEROL SULFATE 2.5; .5 MG/3ML; MG/3ML
3 SOLUTION RESPIRATORY (INHALATION)
Status: DISCONTINUED | OUTPATIENT
Start: 2022-01-01 | End: 2022-01-01

## 2022-01-01 RX ORDER — SODIUM BICARBONATE 84 MG/ML
50 INJECTION, SOLUTION INTRAVENOUS ONCE
Status: COMPLETED | OUTPATIENT
Start: 2022-01-01 | End: 2022-01-01

## 2022-01-01 RX ORDER — CHLORDIAZEPOXIDE HYDROCHLORIDE 25 MG/1
25 CAPSULE, GELATIN COATED ORAL 3 TIMES DAILY
Status: DISCONTINUED | OUTPATIENT
Start: 2022-01-01 | End: 2022-01-01

## 2022-01-01 RX ORDER — HYDROMORPHONE HYDROCHLORIDE 1 MG/ML
1 INJECTION, SOLUTION INTRAMUSCULAR; INTRAVENOUS; SUBCUTANEOUS
Status: DISCONTINUED | OUTPATIENT
Start: 2022-01-01 | End: 2022-01-01

## 2022-01-01 RX ORDER — LANOLIN ALCOHOL/MO/W.PET/CERES
1 CREAM (GRAM) TOPICAL 2 TIMES DAILY
COMMUNITY
Start: 2021-01-15

## 2022-01-01 RX ORDER — CHLORDIAZEPOXIDE HYDROCHLORIDE 5 MG/1
10 CAPSULE, GELATIN COATED ORAL 3 TIMES DAILY
Status: DISCONTINUED | OUTPATIENT
Start: 2022-01-01 | End: 2022-01-01

## 2022-01-01 RX ORDER — SODIUM CHLORIDE 9 MG/ML
250 INJECTION, SOLUTION INTRAVENOUS AS NEEDED
Status: DISCONTINUED | OUTPATIENT
Start: 2022-01-01 | End: 2022-01-01

## 2022-01-01 RX ORDER — SODIUM BICARBONATE IN D5W 150/1000ML
PLASTIC BAG, INJECTION (ML) INTRAVENOUS CONTINUOUS
Status: DISCONTINUED | OUTPATIENT
Start: 2022-01-01 | End: 2022-01-01

## 2022-01-01 RX ORDER — MAGNESIUM SULFATE HEPTAHYDRATE 40 MG/ML
2 INJECTION, SOLUTION INTRAVENOUS ONCE
Status: COMPLETED | OUTPATIENT
Start: 2022-01-01 | End: 2022-01-01

## 2022-01-01 RX ORDER — MIDODRINE HYDROCHLORIDE 5 MG/1
10 TABLET ORAL
Status: DISCONTINUED | OUTPATIENT
Start: 2022-01-01 | End: 2022-01-01

## 2022-01-01 RX ORDER — SODIUM CHLORIDE 0.9 % (FLUSH) 0.9 %
10 SYRINGE (ML) INJECTION EVERY 12 HOURS
Status: DISCONTINUED | OUTPATIENT
Start: 2022-01-01 | End: 2022-01-01

## 2022-01-01 RX ORDER — ASCORBIC ACID 500 MG
500 TABLET ORAL 2 TIMES DAILY
Status: DISCONTINUED | OUTPATIENT
Start: 2022-01-01 | End: 2022-01-01

## 2022-01-01 RX ORDER — POTASSIUM CHLORIDE 750 MG/1
40 TABLET, FILM COATED, EXTENDED RELEASE ORAL
Status: COMPLETED | OUTPATIENT
Start: 2022-01-01 | End: 2022-01-01

## 2022-01-01 RX ORDER — CHLORHEXIDINE GLUCONATE 1.2 MG/ML
15 RINSE ORAL EVERY 12 HOURS
Status: DISCONTINUED | OUTPATIENT
Start: 2022-01-01 | End: 2022-01-01

## 2022-01-01 RX ORDER — MELATONIN
1000 DAILY
Status: DISCONTINUED | OUTPATIENT
Start: 2022-01-01 | End: 2022-01-01

## 2022-01-01 RX ORDER — ATENOLOL 50 MG/1
50 TABLET ORAL DAILY
COMMUNITY
Start: 2021-01-01

## 2022-01-01 RX ORDER — DEXTROSE, SODIUM CHLORIDE, AND POTASSIUM CHLORIDE 5; .9; .15 G/100ML; G/100ML; G/100ML
75 INJECTION INTRAVENOUS CONTINUOUS
Status: DISCONTINUED | OUTPATIENT
Start: 2022-01-01 | End: 2022-01-01

## 2022-01-01 RX ORDER — LANOLIN ALCOHOL/MO/W.PET/CERES
100 CREAM (GRAM) TOPICAL DAILY
Status: DISCONTINUED | OUTPATIENT
Start: 2022-01-01 | End: 2022-01-01

## 2022-01-01 RX ORDER — BUDESONIDE 0.5 MG/2ML
500 INHALANT ORAL
Status: DISCONTINUED | OUTPATIENT
Start: 2022-01-01 | End: 2022-01-01

## 2022-01-01 RX ORDER — POTASSIUM CHLORIDE 750 MG/1
20 TABLET, FILM COATED, EXTENDED RELEASE ORAL 2 TIMES DAILY
Status: DISCONTINUED | OUTPATIENT
Start: 2022-01-01 | End: 2022-01-01

## 2022-01-01 RX ORDER — ENOXAPARIN SODIUM 100 MG/ML
40 INJECTION SUBCUTANEOUS EVERY 24 HOURS
Status: DISCONTINUED | OUTPATIENT
Start: 2022-01-01 | End: 2022-01-01

## 2022-01-01 RX ORDER — LEVOFLOXACIN 5 MG/ML
750 INJECTION, SOLUTION INTRAVENOUS ONCE
Status: COMPLETED | OUTPATIENT
Start: 2022-01-01 | End: 2022-01-01

## 2022-01-01 RX ORDER — DEXTROSE MONOHYDRATE AND SODIUM CHLORIDE 5; .9 G/100ML; G/100ML
100 INJECTION, SOLUTION INTRAVENOUS CONTINUOUS
Status: DISCONTINUED | OUTPATIENT
Start: 2022-01-01 | End: 2022-01-01

## 2022-01-01 RX ORDER — POTASSIUM CHLORIDE 29.8 MG/ML
20 INJECTION INTRAVENOUS ONCE
Status: COMPLETED | OUTPATIENT
Start: 2022-01-01 | End: 2022-01-01

## 2022-01-01 RX ORDER — GUAIFENESIN 600 MG/1
600 TABLET, EXTENDED RELEASE ORAL EVERY 12 HOURS
Status: DISCONTINUED | OUTPATIENT
Start: 2022-01-01 | End: 2022-01-01

## 2022-01-01 RX ORDER — THERA TABS 400 MCG
1 TAB ORAL DAILY
Status: DISCONTINUED | OUTPATIENT
Start: 2022-01-01 | End: 2022-01-01

## 2022-01-01 RX ORDER — HEPARIN SODIUM 5000 [USP'U]/ML
5000 INJECTION, SOLUTION INTRAVENOUS; SUBCUTANEOUS EVERY 8 HOURS
Status: DISCONTINUED | OUTPATIENT
Start: 2022-01-01 | End: 2022-01-01

## 2022-01-01 RX ORDER — LEVOFLOXACIN 5 MG/ML
250 INJECTION, SOLUTION INTRAVENOUS EVERY 24 HOURS
Status: COMPLETED | OUTPATIENT
Start: 2022-01-01 | End: 2022-01-01

## 2022-01-01 RX ORDER — SODIUM BICARBONATE 84 MG/ML
100 INJECTION, SOLUTION INTRAVENOUS ONCE
Status: COMPLETED | OUTPATIENT
Start: 2022-01-01 | End: 2022-01-01

## 2022-01-01 RX ORDER — CALCIUM GLUCONATE 20 MG/ML
2 INJECTION, SOLUTION INTRAVENOUS ONCE
Status: COMPLETED | OUTPATIENT
Start: 2022-01-01 | End: 2022-01-01

## 2022-01-01 RX ORDER — SODIUM BICARBONATE 84 MG/ML
INJECTION, SOLUTION INTRAVENOUS
Status: COMPLETED
Start: 2022-01-01 | End: 2022-01-01

## 2022-01-01 RX ORDER — INSULIN LISPRO 100 [IU]/ML
INJECTION, SOLUTION INTRAVENOUS; SUBCUTANEOUS
Status: DISCONTINUED | OUTPATIENT
Start: 2022-01-01 | End: 2022-01-01

## 2022-01-01 RX ORDER — LORAZEPAM 2 MG/ML
2 INJECTION INTRAMUSCULAR
Status: DISCONTINUED | OUTPATIENT
Start: 2022-01-01 | End: 2022-01-01

## 2022-01-01 RX ORDER — DEXMEDETOMIDINE HYDROCHLORIDE 4 UG/ML
.1-1.5 INJECTION, SOLUTION INTRAVENOUS
Status: DISCONTINUED | OUTPATIENT
Start: 2022-01-01 | End: 2022-01-01

## 2022-01-01 RX ORDER — CLOPIDOGREL BISULFATE 75 MG/1
75 TABLET ORAL DAILY
COMMUNITY
Start: 2021-01-01

## 2022-01-01 RX ORDER — BALSAM PERU/CASTOR OIL
OINTMENT (GRAM) TOPICAL 3 TIMES DAILY
Status: DISCONTINUED | OUTPATIENT
Start: 2022-01-01 | End: 2022-01-01

## 2022-01-01 RX ORDER — NOREPINEPHRINE BITARTRATE/D5W 8 MG/250ML
.5-3 PLASTIC BAG, INJECTION (ML) INTRAVENOUS
Status: DISCONTINUED | OUTPATIENT
Start: 2022-01-01 | End: 2022-01-01 | Stop reason: SDUPTHER

## 2022-01-01 RX ORDER — ARFORMOTEROL TARTRATE 15 UG/2ML
15 SOLUTION RESPIRATORY (INHALATION)
Status: DISCONTINUED | OUTPATIENT
Start: 2022-01-01 | End: 2022-01-01

## 2022-01-01 RX ORDER — CYANOCOBALAMIN (VITAMIN B-12) 500 MCG
400 TABLET ORAL DAILY
Status: DISCONTINUED | OUTPATIENT
Start: 2022-01-01 | End: 2022-01-01

## 2022-01-01 RX ORDER — ACETAMINOPHEN 325 MG/1
650 TABLET ORAL
Status: DISCONTINUED | OUTPATIENT
Start: 2022-01-01 | End: 2022-01-01

## 2022-01-01 RX ORDER — MAGNESIUM SULFATE 100 %
4 CRYSTALS MISCELLANEOUS AS NEEDED
Status: DISCONTINUED | OUTPATIENT
Start: 2022-01-01 | End: 2022-01-01

## 2022-01-01 RX ORDER — DOXAZOSIN 2 MG/1
2 TABLET ORAL DAILY
Status: DISCONTINUED | OUTPATIENT
Start: 2022-01-01 | End: 2022-01-01

## 2022-01-01 RX ORDER — MORPHINE SULFATE 4 MG/ML
4 INJECTION INTRAVENOUS ONCE
Status: COMPLETED | OUTPATIENT
Start: 2022-01-01 | End: 2022-01-01

## 2022-01-01 RX ORDER — PANTOPRAZOLE SODIUM 40 MG/10ML
40 INJECTION, POWDER, LYOPHILIZED, FOR SOLUTION INTRAVENOUS EVERY 12 HOURS
Status: DISCONTINUED | OUTPATIENT
Start: 2022-01-01 | End: 2022-01-01

## 2022-01-01 RX ORDER — HYDROCORTISONE SODIUM SUCCINATE 100 MG/2ML
50 INJECTION, POWDER, FOR SOLUTION INTRAMUSCULAR; INTRAVENOUS EVERY 6 HOURS
Status: DISCONTINUED | OUTPATIENT
Start: 2022-01-01 | End: 2022-01-01

## 2022-01-01 RX ORDER — INSULIN LISPRO 100 [IU]/ML
INJECTION, SOLUTION INTRAVENOUS; SUBCUTANEOUS EVERY 6 HOURS
Status: DISCONTINUED | OUTPATIENT
Start: 2022-01-01 | End: 2022-01-01

## 2022-01-01 RX ORDER — SODIUM BICARBONATE 650 MG/1
650 TABLET ORAL 3 TIMES DAILY
Status: DISCONTINUED | OUTPATIENT
Start: 2022-01-01 | End: 2022-01-01

## 2022-01-01 RX ORDER — FUROSEMIDE 10 MG/ML
60 INJECTION INTRAMUSCULAR; INTRAVENOUS 2 TIMES DAILY
Status: DISCONTINUED | OUTPATIENT
Start: 2022-01-01 | End: 2022-01-01

## 2022-01-01 RX ORDER — ALBUMIN HUMAN 50 G/1000ML
SOLUTION INTRAVENOUS
Status: COMPLETED
Start: 2022-01-01 | End: 2022-01-01

## 2022-01-01 RX ORDER — BALSAM PERU/CASTOR OIL
OINTMENT (GRAM) TOPICAL 2 TIMES DAILY
Status: DISCONTINUED | OUTPATIENT
Start: 2022-01-01 | End: 2022-01-01

## 2022-01-01 RX ORDER — ALBUMIN HUMAN 250 G/1000ML
25 SOLUTION INTRAVENOUS EVERY 6 HOURS
Status: DISCONTINUED | OUTPATIENT
Start: 2022-01-01 | End: 2022-01-01

## 2022-01-01 RX ORDER — LEVOFLOXACIN 5 MG/ML
750 INJECTION, SOLUTION INTRAVENOUS
Status: DISCONTINUED | OUTPATIENT
Start: 2022-01-01 | End: 2022-01-01

## 2022-01-01 RX ORDER — ALBUMIN HUMAN 50 G/1000ML
25 SOLUTION INTRAVENOUS ONCE
Status: COMPLETED | OUTPATIENT
Start: 2022-01-01 | End: 2022-01-01

## 2022-01-01 RX ORDER — FOLIC ACID 1 MG/1
1 TABLET ORAL DAILY
Status: DISCONTINUED | OUTPATIENT
Start: 2022-01-01 | End: 2022-01-01

## 2022-01-01 RX ORDER — LORAZEPAM 2 MG/ML
4 INJECTION INTRAMUSCULAR
Status: DISCONTINUED | OUTPATIENT
Start: 2022-01-01 | End: 2022-01-01

## 2022-01-01 RX ORDER — SODIUM BICARBONATE 1 MEQ/ML
100 SYRINGE (ML) INTRAVENOUS ONCE
Status: DISCONTINUED | OUTPATIENT
Start: 2022-01-01 | End: 2022-01-01 | Stop reason: CLARIF

## 2022-01-01 RX ORDER — ENOXAPARIN SODIUM 100 MG/ML
30 INJECTION SUBCUTANEOUS EVERY 24 HOURS
Status: DISCONTINUED | OUTPATIENT
Start: 2022-01-01 | End: 2022-01-01

## 2022-01-01 RX ORDER — ALBUMIN HUMAN 250 G/1000ML
25 SOLUTION INTRAVENOUS EVERY 6 HOURS
Status: DISPENSED | OUTPATIENT
Start: 2022-01-01 | End: 2022-01-01

## 2022-01-01 RX ADMIN — ALBUMIN (HUMAN) 25 G: 0.25 INJECTION, SOLUTION INTRAVENOUS at 00:02

## 2022-01-01 RX ADMIN — SODIUM BICARBONATE 650 MG: 650 TABLET ORAL at 09:25

## 2022-01-01 RX ADMIN — VANCOMYCIN HYDROCHLORIDE 500 MG: 500 INJECTION, POWDER, LYOPHILIZED, FOR SOLUTION INTRAVENOUS at 21:03

## 2022-01-01 RX ADMIN — POTASSIUM CHLORIDE 20 MEQ: 750 TABLET, FILM COATED, EXTENDED RELEASE ORAL at 08:26

## 2022-01-01 RX ADMIN — Medication: at 09:25

## 2022-01-01 RX ADMIN — SODIUM CHLORIDE 500 ML: 9 INJECTION, SOLUTION INTRAVENOUS at 14:08

## 2022-01-01 RX ADMIN — CALCIUM GLUCONATE 2 G: 20 INJECTION, SOLUTION INTRAVENOUS at 13:45

## 2022-01-01 RX ADMIN — NOREPINEPHRINE BITARTRATE 30 MCG/MIN: 1 INJECTION, SOLUTION, CONCENTRATE INTRAVENOUS at 23:38

## 2022-01-01 RX ADMIN — SODIUM CHLORIDE, PRESERVATIVE FREE 10 ML: 5 INJECTION INTRAVENOUS at 21:39

## 2022-01-01 RX ADMIN — CHOLECALCIFEROL (VITAMIN D3) 10 MCG (400 UNIT) TABLET 400 UNITS: at 08:31

## 2022-01-01 RX ADMIN — POTASSIUM CHLORIDE, DEXTROSE MONOHYDRATE AND SODIUM CHLORIDE 75 ML/HR: 150; 5; 900 INJECTION, SOLUTION INTRAVENOUS at 05:06

## 2022-01-01 RX ADMIN — CHLORHEXIDINE GLUCONATE 15 ML: 1.2 RINSE ORAL at 08:32

## 2022-01-01 RX ADMIN — DEXTROSE AND SODIUM CHLORIDE 100 ML/HR: 5; 900 INJECTION, SOLUTION INTRAVENOUS at 01:31

## 2022-01-01 RX ADMIN — HYDROCORTISONE SODIUM SUCCINATE 50 MG: 100 INJECTION, POWDER, FOR SOLUTION INTRAMUSCULAR; INTRAVENOUS at 00:28

## 2022-01-01 RX ADMIN — BUDESONIDE 500 MCG: 0.5 INHALANT RESPIRATORY (INHALATION) at 07:36

## 2022-01-01 RX ADMIN — CHOLECALCIFEROL (VITAMIN D3) 10 MCG (400 UNIT) TABLET 400 UNITS: at 08:26

## 2022-01-01 RX ADMIN — HYDROCORTISONE SODIUM SUCCINATE 50 MG: 100 INJECTION, POWDER, FOR SOLUTION INTRAMUSCULAR; INTRAVENOUS at 18:43

## 2022-01-01 RX ADMIN — SODIUM PHOSPHATE, MONOBASIC, MONOHYDRATE: 276; 142 INJECTION, SOLUTION INTRAVENOUS at 23:04

## 2022-01-01 RX ADMIN — POTASSIUM CHLORIDE 40 MEQ: 750 TABLET, FILM COATED, EXTENDED RELEASE ORAL at 09:52

## 2022-01-01 RX ADMIN — AZTREONAM 2 G: 2 INJECTION, POWDER, LYOPHILIZED, FOR SOLUTION INTRAMUSCULAR; INTRAVENOUS at 21:03

## 2022-01-01 RX ADMIN — FUROSEMIDE 60 MG: 10 INJECTION, SOLUTION INTRAVENOUS at 12:41

## 2022-01-01 RX ADMIN — DOXAZOSIN 2 MG: 2 TABLET ORAL at 09:25

## 2022-01-01 RX ADMIN — NOREPINEPHRINE BITARTRATE 30 MCG/MIN: 1 INJECTION, SOLUTION, CONCENTRATE INTRAVENOUS at 03:30

## 2022-01-01 RX ADMIN — SODIUM CHLORIDE, PRESERVATIVE FREE 10 ML: 5 INJECTION INTRAVENOUS at 08:30

## 2022-01-01 RX ADMIN — SODIUM BICARBONATE: 84 INJECTION, SOLUTION INTRAVENOUS at 01:03

## 2022-01-01 RX ADMIN — PHENYLEPHRINE HYDROCHLORIDE 190 MCG/MIN: 10 INJECTION INTRAVENOUS at 14:45

## 2022-01-01 RX ADMIN — ARFORMOTEROL TARTRATE 15 MCG: 15 SOLUTION RESPIRATORY (INHALATION) at 20:18

## 2022-01-01 RX ADMIN — SODIUM CHLORIDE 1000 ML: 9 INJECTION, SOLUTION INTRAVENOUS at 15:15

## 2022-01-01 RX ADMIN — VASOPRESSIN 0.04 UNITS/MIN: 20 INJECTION INTRAVENOUS at 21:28

## 2022-01-01 RX ADMIN — SODIUM CHLORIDE, PRESERVATIVE FREE 10 ML: 5 INJECTION INTRAVENOUS at 20:21

## 2022-01-01 RX ADMIN — VASOPRESSIN 0.04 UNITS/MIN: 20 INJECTION INTRAVENOUS at 14:39

## 2022-01-01 RX ADMIN — HYDROCORTISONE SODIUM SUCCINATE 50 MG: 100 INJECTION, POWDER, FOR SOLUTION INTRAMUSCULAR; INTRAVENOUS at 18:02

## 2022-01-01 RX ADMIN — PHENYLEPHRINE HYDROCHLORIDE 270 MCG/MIN: 10 INJECTION INTRAVENOUS at 06:27

## 2022-01-01 RX ADMIN — ALBUMIN (HUMAN) 25 G: 0.25 INJECTION, SOLUTION INTRAVENOUS at 11:34

## 2022-01-01 RX ADMIN — EPINEPHRINE 6 MCG/MIN: 1 INJECTION INTRAMUSCULAR; INTRAVENOUS; SUBCUTANEOUS at 20:00

## 2022-01-01 RX ADMIN — PANTOPRAZOLE SODIUM 40 MG: 40 INJECTION, POWDER, FOR SOLUTION INTRAVENOUS at 20:57

## 2022-01-01 RX ADMIN — DOXAZOSIN 2 MG: 2 TABLET ORAL at 09:12

## 2022-01-01 RX ADMIN — SODIUM BICARBONATE 650 MG: 650 TABLET ORAL at 08:32

## 2022-01-01 RX ADMIN — SODIUM BICARBONATE 650 MG: 650 TABLET ORAL at 21:48

## 2022-01-01 RX ADMIN — VANCOMYCIN HYDROCHLORIDE 500 MG: 500 INJECTION, POWDER, LYOPHILIZED, FOR SOLUTION INTRAVENOUS at 08:33

## 2022-01-01 RX ADMIN — Medication: at 09:00

## 2022-01-01 RX ADMIN — GUAIFENESIN 600 MG: 600 TABLET, EXTENDED RELEASE ORAL at 20:58

## 2022-01-01 RX ADMIN — PHENYLEPHRINE HYDROCHLORIDE 100 MCG/MIN: 10 INJECTION INTRAVENOUS at 23:10

## 2022-01-01 RX ADMIN — SODIUM CHLORIDE 125 ML/HR: 9 INJECTION, SOLUTION INTRAVENOUS at 08:25

## 2022-01-01 RX ADMIN — Medication: at 22:51

## 2022-01-01 RX ADMIN — Medication 30 MCG/MIN: at 00:31

## 2022-01-01 RX ADMIN — Medication: at 17:09

## 2022-01-01 RX ADMIN — CHLORHEXIDINE GLUCONATE 15 ML: 1.2 RINSE ORAL at 21:05

## 2022-01-01 RX ADMIN — VASOPRESSIN 0.04 UNITS/MIN: 20 INJECTION INTRAVENOUS at 00:41

## 2022-01-01 RX ADMIN — POTASSIUM CHLORIDE 20 MEQ: 750 TABLET, FILM COATED, EXTENDED RELEASE ORAL at 08:32

## 2022-01-01 RX ADMIN — VASOPRESSIN 0.04 UNITS/MIN: 20 INJECTION INTRAVENOUS at 20:21

## 2022-01-01 RX ADMIN — AZTREONAM 2 G: 2 INJECTION, POWDER, LYOPHILIZED, FOR SOLUTION INTRAMUSCULAR; INTRAVENOUS at 14:50

## 2022-01-01 RX ADMIN — BUDESONIDE 500 MCG: 0.5 INHALANT RESPIRATORY (INHALATION) at 08:13

## 2022-01-01 RX ADMIN — DOXAZOSIN 2 MG: 2 TABLET ORAL at 08:31

## 2022-01-01 RX ADMIN — SODIUM BICARBONATE 650 MG: 650 TABLET ORAL at 08:26

## 2022-01-01 RX ADMIN — PANTOPRAZOLE SODIUM 40 MG: 40 INJECTION, POWDER, FOR SOLUTION INTRAVENOUS at 09:24

## 2022-01-01 RX ADMIN — PHENYLEPHRINE HYDROCHLORIDE 160 MCG/MIN: 10 INJECTION INTRAVENOUS at 11:55

## 2022-01-01 RX ADMIN — NOREPINEPHRINE BITARTRATE 30 MCG/MIN: 1 INJECTION, SOLUTION, CONCENTRATE INTRAVENOUS at 09:35

## 2022-01-01 RX ADMIN — PANTOPRAZOLE SODIUM 40 MG: 40 INJECTION, POWDER, FOR SOLUTION INTRAVENOUS at 09:05

## 2022-01-01 RX ADMIN — PHENYLEPHRINE HYDROCHLORIDE 125 MCG/MIN: 10 INJECTION INTRAVENOUS at 03:29

## 2022-01-01 RX ADMIN — SODIUM BICARBONATE: 84 INJECTION, SOLUTION INTRAVENOUS at 06:39

## 2022-01-01 RX ADMIN — VANCOMYCIN HYDROCHLORIDE 500 MG: 500 INJECTION, POWDER, LYOPHILIZED, FOR SOLUTION INTRAVENOUS at 08:26

## 2022-01-01 RX ADMIN — PHENYLEPHRINE HYDROCHLORIDE 130 MCG/MIN: 10 INJECTION INTRAVENOUS at 05:53

## 2022-01-01 RX ADMIN — VANCOMYCIN HYDROCHLORIDE 500 MG: 500 INJECTION, POWDER, LYOPHILIZED, FOR SOLUTION INTRAVENOUS at 20:13

## 2022-01-01 RX ADMIN — Medication 100 MG: at 08:31

## 2022-01-01 RX ADMIN — AZTREONAM 2 G: 2 INJECTION, POWDER, LYOPHILIZED, FOR SOLUTION INTRAMUSCULAR; INTRAVENOUS at 04:59

## 2022-01-01 RX ADMIN — Medication: at 09:11

## 2022-01-01 RX ADMIN — PANTOPRAZOLE SODIUM 40 MG: 40 INJECTION, POWDER, FOR SOLUTION INTRAVENOUS at 20:20

## 2022-01-01 RX ADMIN — FOLIC ACID 1 MG: 1 TABLET ORAL at 08:31

## 2022-01-01 RX ADMIN — ALBUMIN HUMAN 25 G: 50 SOLUTION INTRAVENOUS at 19:45

## 2022-01-01 RX ADMIN — MAGNESIUM SULFATE HEPTAHYDRATE 2 G: 40 INJECTION, SOLUTION INTRAVENOUS at 22:30

## 2022-01-01 RX ADMIN — ALBUMIN (HUMAN) 25 G: 0.25 INJECTION, SOLUTION INTRAVENOUS at 00:28

## 2022-01-01 RX ADMIN — AZTREONAM 2 G: 2 INJECTION, POWDER, LYOPHILIZED, FOR SOLUTION INTRAMUSCULAR; INTRAVENOUS at 22:13

## 2022-01-01 RX ADMIN — ENOXAPARIN SODIUM 40 MG: 100 INJECTION SUBCUTANEOUS at 08:32

## 2022-01-01 RX ADMIN — PHENYLEPHRINE HYDROCHLORIDE 130 MCG/MIN: 10 INJECTION INTRAVENOUS at 07:47

## 2022-01-01 RX ADMIN — THIAMINE HYDROCHLORIDE 500 MG: 100 INJECTION, SOLUTION INTRAMUSCULAR; INTRAVENOUS at 09:37

## 2022-01-01 RX ADMIN — Medication 50 MCG/HR: at 22:17

## 2022-01-01 RX ADMIN — SODIUM CHLORIDE 1000 ML: 9 INJECTION, SOLUTION INTRAVENOUS at 19:09

## 2022-01-01 RX ADMIN — SODIUM CHLORIDE, PRESERVATIVE FREE 10 ML: 5 INJECTION INTRAVENOUS at 08:26

## 2022-01-01 RX ADMIN — THIAMINE HYDROCHLORIDE 500 MG: 100 INJECTION, SOLUTION INTRAMUSCULAR; INTRAVENOUS at 16:18

## 2022-01-01 RX ADMIN — PHYTONADIONE 10 MG: 10 INJECTION, EMULSION INTRAMUSCULAR; INTRAVENOUS; SUBCUTANEOUS at 09:35

## 2022-01-01 RX ADMIN — CHOLECALCIFEROL (VITAMIN D3) 10 MCG (400 UNIT) TABLET 400 UNITS: at 11:23

## 2022-01-01 RX ADMIN — PANTOPRAZOLE SODIUM 40 MG: 40 INJECTION, POWDER, FOR SOLUTION INTRAVENOUS at 08:26

## 2022-01-01 RX ADMIN — ALBUMIN (HUMAN) 25 G: 0.25 INJECTION, SOLUTION INTRAVENOUS at 17:57

## 2022-01-01 RX ADMIN — Medication: at 16:09

## 2022-01-01 RX ADMIN — SODIUM BICARBONATE 650 MG: 650 TABLET ORAL at 20:20

## 2022-01-01 RX ADMIN — SODIUM CHLORIDE, PRESERVATIVE FREE 10 ML: 5 INJECTION INTRAVENOUS at 20:57

## 2022-01-01 RX ADMIN — NOREPINEPHRINE BITARTRATE 30 MCG/MIN: 1 INJECTION, SOLUTION, CONCENTRATE INTRAVENOUS at 18:33

## 2022-01-01 RX ADMIN — VASOPRESSIN 0.04 UNITS/MIN: 20 INJECTION INTRAVENOUS at 03:43

## 2022-01-01 RX ADMIN — PHENYLEPHRINE HYDROCHLORIDE 210 MCG/MIN: 10 INJECTION INTRAVENOUS at 22:50

## 2022-01-01 RX ADMIN — SODIUM CHLORIDE, PRESERVATIVE FREE 10 ML: 5 INJECTION INTRAVENOUS at 12:30

## 2022-01-01 RX ADMIN — LEVOFLOXACIN 250 MG: 5 INJECTION, SOLUTION INTRAVENOUS at 20:54

## 2022-01-01 RX ADMIN — SODIUM CHLORIDE, PRESERVATIVE FREE 10 ML: 5 INJECTION INTRAVENOUS at 09:05

## 2022-01-01 RX ADMIN — VASOPRESSIN 0.04 UNITS/MIN: 20 INJECTION INTRAVENOUS at 12:33

## 2022-01-01 RX ADMIN — HEPARIN SODIUM 5000 UNITS: 5000 INJECTION INTRAVENOUS; SUBCUTANEOUS at 22:30

## 2022-01-01 RX ADMIN — Medication: at 08:34

## 2022-01-01 RX ADMIN — ACETAMINOPHEN 650 MG: 325 TABLET ORAL at 04:57

## 2022-01-01 RX ADMIN — DEXTROSE AND SODIUM CHLORIDE 100 ML/HR: 5; 900 INJECTION, SOLUTION INTRAVENOUS at 15:18

## 2022-01-01 RX ADMIN — LEVOFLOXACIN 250 MG: 5 INJECTION, SOLUTION INTRAVENOUS at 23:04

## 2022-01-01 RX ADMIN — SODIUM BICARBONATE 650 MG: 650 TABLET ORAL at 15:46

## 2022-01-01 RX ADMIN — LEVOFLOXACIN 250 MG: 5 INJECTION, SOLUTION INTRAVENOUS at 21:03

## 2022-01-01 RX ADMIN — MORPHINE SULFATE 4 MG: 4 INJECTION INTRAVENOUS at 11:47

## 2022-01-01 RX ADMIN — PHENYLEPHRINE HYDROCHLORIDE 30 MCG/MIN: 10 INJECTION INTRAVENOUS at 17:32

## 2022-01-01 RX ADMIN — SODIUM CHLORIDE, PRESERVATIVE FREE 10 ML: 5 INJECTION INTRAVENOUS at 21:00

## 2022-01-01 RX ADMIN — SODIUM BICARBONATE 100 MEQ: 84 INJECTION, SOLUTION INTRAVENOUS at 05:46

## 2022-01-01 RX ADMIN — VANCOMYCIN HYDROCHLORIDE 1000 MG: 1 INJECTION, POWDER, LYOPHILIZED, FOR SOLUTION INTRAVENOUS at 20:19

## 2022-01-01 RX ADMIN — CHOLECALCIFEROL (VITAMIN D3) 10 MCG (400 UNIT) TABLET 400 UNITS: at 09:25

## 2022-01-01 RX ADMIN — VANCOMYCIN HYDROCHLORIDE 500 MG: 500 INJECTION, POWDER, LYOPHILIZED, FOR SOLUTION INTRAVENOUS at 08:58

## 2022-01-01 RX ADMIN — LEVOFLOXACIN 750 MG: 750 INJECTION, SOLUTION INTRAVENOUS at 21:19

## 2022-01-01 RX ADMIN — SODIUM CHLORIDE 1000 ML: 9 INJECTION, SOLUTION INTRAVENOUS at 00:42

## 2022-01-01 RX ADMIN — NOREPINEPHRINE BITARTRATE 30 MCG/MIN: 1 INJECTION, SOLUTION, CONCENTRATE INTRAVENOUS at 04:46

## 2022-01-01 RX ADMIN — SODIUM BICARBONATE 650 MG: 650 TABLET ORAL at 21:02

## 2022-01-01 RX ADMIN — BUDESONIDE 500 MCG: 0.5 INHALANT RESPIRATORY (INHALATION) at 20:53

## 2022-01-01 RX ADMIN — ALBUMIN (HUMAN) 25 G: 0.25 INJECTION, SOLUTION INTRAVENOUS at 05:00

## 2022-01-01 RX ADMIN — THIAMINE HYDROCHLORIDE 500 MG: 100 INJECTION, SOLUTION INTRAMUSCULAR; INTRAVENOUS at 00:51

## 2022-01-01 RX ADMIN — ENOXAPARIN SODIUM 30 MG: 100 INJECTION SUBCUTANEOUS at 09:11

## 2022-01-01 RX ADMIN — THIAMINE HYDROCHLORIDE 500 MG: 100 INJECTION, SOLUTION INTRAMUSCULAR; INTRAVENOUS at 02:07

## 2022-01-01 RX ADMIN — BUDESONIDE 500 MCG: 0.5 INHALANT RESPIRATORY (INHALATION) at 20:18

## 2022-01-01 RX ADMIN — ARFORMOTEROL TARTRATE 15 MCG: 15 SOLUTION RESPIRATORY (INHALATION) at 19:22

## 2022-01-01 RX ADMIN — CHLORHEXIDINE GLUCONATE 15 ML: 1.2 RINSE ORAL at 08:29

## 2022-01-01 RX ADMIN — GUAIFENESIN 600 MG: 600 TABLET, EXTENDED RELEASE ORAL at 13:21

## 2022-01-01 RX ADMIN — NOREPINEPHRINE BITARTRATE 22 MCG/MIN: 1 INJECTION, SOLUTION, CONCENTRATE INTRAVENOUS at 10:00

## 2022-01-01 RX ADMIN — SODIUM BICARBONATE 650 MG: 650 TABLET ORAL at 18:27

## 2022-01-01 RX ADMIN — CHLORHEXIDINE GLUCONATE 15 ML: 1.2 RINSE ORAL at 22:12

## 2022-01-01 RX ADMIN — CALCIUM GLUCONATE 3 G: 98 INJECTION, SOLUTION INTRAVENOUS at 04:59

## 2022-01-01 RX ADMIN — AZTREONAM 2 G: 2 INJECTION, POWDER, LYOPHILIZED, FOR SOLUTION INTRAMUSCULAR; INTRAVENOUS at 20:57

## 2022-01-01 RX ADMIN — PANTOPRAZOLE SODIUM 40 MG: 40 INJECTION, POWDER, FOR SOLUTION INTRAVENOUS at 20:58

## 2022-01-01 RX ADMIN — VASOPRESSIN 0.04 UNITS/MIN: 20 INJECTION INTRAVENOUS at 08:51

## 2022-01-01 RX ADMIN — POTASSIUM CHLORIDE 20 MEQ: 29.8 INJECTION, SOLUTION INTRAVENOUS at 23:32

## 2022-01-01 RX ADMIN — SODIUM BICARBONATE 50 MEQ: 84 INJECTION, SOLUTION INTRAVENOUS at 12:27

## 2022-01-01 RX ADMIN — DEXMEDETOMIDINE HYDROCHLORIDE 1 MCG/KG/HR: 400 INJECTION INTRAVENOUS at 20:22

## 2022-01-01 RX ADMIN — THIAMINE HYDROCHLORIDE 500 MG: 100 INJECTION, SOLUTION INTRAMUSCULAR; INTRAVENOUS at 10:00

## 2022-01-01 RX ADMIN — POTASSIUM CHLORIDE 20 MEQ: 750 TABLET, FILM COATED, EXTENDED RELEASE ORAL at 18:27

## 2022-01-01 RX ADMIN — DEXTROSE MONOHYDRATE 12.5 G: 25 INJECTION, SOLUTION INTRAVENOUS at 12:18

## 2022-01-01 RX ADMIN — SODIUM CHLORIDE 125 ML/HR: 9 INJECTION, SOLUTION INTRAVENOUS at 23:16

## 2022-01-01 RX ADMIN — EPINEPHRINE 1 MCG/MIN: 1 INJECTION INTRAMUSCULAR; INTRAVENOUS; SUBCUTANEOUS at 12:23

## 2022-01-01 RX ADMIN — AZTREONAM 2 G: 2 INJECTION, POWDER, LYOPHILIZED, FOR SOLUTION INTRAMUSCULAR; INTRAVENOUS at 05:47

## 2022-01-01 RX ADMIN — HYDROCORTISONE SODIUM SUCCINATE 50 MG: 100 INJECTION, POWDER, FOR SOLUTION INTRAMUSCULAR; INTRAVENOUS at 11:07

## 2022-01-01 RX ADMIN — Medication: at 09:04

## 2022-01-01 RX ADMIN — THIAMINE HYDROCHLORIDE 100 MG: 100 INJECTION, SOLUTION INTRAMUSCULAR; INTRAVENOUS at 09:28

## 2022-01-01 RX ADMIN — POTASSIUM BICARBONATE 20 MEQ: 782 TABLET, EFFERVESCENT ORAL at 18:04

## 2022-01-01 RX ADMIN — CHLORDIAZEPOXIDE HYDROCHLORIDE 25 MG: 25 CAPSULE ORAL at 11:23

## 2022-01-01 RX ADMIN — AZTREONAM 2 G: 2 INJECTION, POWDER, LYOPHILIZED, FOR SOLUTION INTRAMUSCULAR; INTRAVENOUS at 04:46

## 2022-01-01 RX ADMIN — ALBUMIN (HUMAN) 25 G: 0.25 INJECTION, SOLUTION INTRAVENOUS at 12:21

## 2022-01-01 RX ADMIN — ARFORMOTEROL TARTRATE 15 MCG: 15 SOLUTION RESPIRATORY (INHALATION) at 08:13

## 2022-01-01 RX ADMIN — PHYTONADIONE 10 MG: 10 INJECTION, EMULSION INTRAMUSCULAR; INTRAVENOUS; SUBCUTANEOUS at 08:55

## 2022-01-01 RX ADMIN — POTASSIUM CHLORIDE, DEXTROSE MONOHYDRATE AND SODIUM CHLORIDE 75 ML/HR: 150; 5; 900 INJECTION, SOLUTION INTRAVENOUS at 16:00

## 2022-01-01 RX ADMIN — IPRATROPIUM BROMIDE AND ALBUTEROL SULFATE 3 ML: .5; 3 SOLUTION RESPIRATORY (INHALATION) at 16:19

## 2022-01-01 RX ADMIN — CHLORHEXIDINE GLUCONATE 15 ML: 1.2 RINSE ORAL at 20:59

## 2022-01-01 RX ADMIN — ARFORMOTEROL TARTRATE 15 MCG: 15 SOLUTION RESPIRATORY (INHALATION) at 07:36

## 2022-01-01 RX ADMIN — NOREPINEPHRINE BITARTRATE 30 MCG/MIN: 1 INJECTION, SOLUTION, CONCENTRATE INTRAVENOUS at 20:31

## 2022-01-01 RX ADMIN — LEVOFLOXACIN 250 MG: 5 INJECTION, SOLUTION INTRAVENOUS at 20:58

## 2022-01-01 RX ADMIN — HYDROCORTISONE SODIUM SUCCINATE 50 MG: 100 INJECTION, POWDER, FOR SOLUTION INTRAMUSCULAR; INTRAVENOUS at 00:46

## 2022-01-01 RX ADMIN — Medication 100 MG: at 09:11

## 2022-01-01 RX ADMIN — SODIUM BICARBONATE 650 MG: 650 TABLET ORAL at 11:00

## 2022-01-01 RX ADMIN — DEXTROSE MONOHYDRATE 25 G: 25 INJECTION, SOLUTION INTRAVENOUS at 00:45

## 2022-01-01 RX ADMIN — MAGNESIUM SULFATE HEPTAHYDRATE 2 G: 40 INJECTION, SOLUTION INTRAVENOUS at 08:34

## 2022-01-01 RX ADMIN — POTASSIUM CHLORIDE 20 MEQ: 750 TABLET, FILM COATED, EXTENDED RELEASE ORAL at 09:24

## 2022-01-01 RX ADMIN — VASOPRESSIN 0.04 UNITS/MIN: 20 INJECTION INTRAVENOUS at 04:59

## 2022-01-01 RX ADMIN — SODIUM BICARBONATE 650 MG: 650 TABLET ORAL at 15:03

## 2022-01-01 RX ADMIN — CHLORDIAZEPOXIDE HYDROCHLORIDE 25 MG: 25 CAPSULE ORAL at 15:13

## 2022-01-01 RX ADMIN — DEXMEDETOMIDINE HYDROCHLORIDE 0.5 MCG/KG/HR: 400 INJECTION INTRAVENOUS at 12:43

## 2022-01-01 RX ADMIN — SODIUM CHLORIDE, PRESERVATIVE FREE 10 ML: 5 INJECTION INTRAVENOUS at 09:24

## 2022-01-01 RX ADMIN — SODIUM BICARBONATE: 84 INJECTION, SOLUTION INTRAVENOUS at 15:18

## 2022-01-01 RX ADMIN — FOLIC ACID 1 MG: 1 TABLET ORAL at 09:12

## 2022-01-01 RX ADMIN — CHLORHEXIDINE GLUCONATE 15 ML: 1.2 RINSE ORAL at 09:05

## 2022-01-01 RX ADMIN — NOREPINEPHRINE BITARTRATE 30 MCG/MIN: 1 INJECTION, SOLUTION, CONCENTRATE INTRAVENOUS at 14:15

## 2022-01-01 RX ADMIN — Medication 50 MCG/HR: at 20:43

## 2022-01-01 RX ADMIN — BUDESONIDE 500 MCG: 0.5 INHALANT RESPIRATORY (INHALATION) at 19:23

## 2022-01-01 RX ADMIN — ALBUMIN (HUMAN) 25 G: 12.5 INJECTION, SOLUTION INTRAVENOUS at 19:45

## 2022-01-01 RX ADMIN — PANTOPRAZOLE SODIUM 40 MG: 40 INJECTION, POWDER, FOR SOLUTION INTRAVENOUS at 12:30

## 2022-01-01 RX ADMIN — POTASSIUM CHLORIDE 20 MEQ: 750 TABLET, FILM COATED, EXTENDED RELEASE ORAL at 17:08

## 2022-01-01 RX ADMIN — Medication: at 08:32

## 2022-01-01 RX ADMIN — ENOXAPARIN SODIUM 30 MG: 100 INJECTION SUBCUTANEOUS at 09:34

## 2022-01-01 RX ADMIN — ALBUMIN (HUMAN) 25 G: 0.25 INJECTION, SOLUTION INTRAVENOUS at 05:55

## 2022-01-01 RX ADMIN — PANTOPRAZOLE SODIUM 40 MG: 40 INJECTION, POWDER, FOR SOLUTION INTRAVENOUS at 21:39

## 2022-01-01 RX ADMIN — THIAMINE HYDROCHLORIDE 500 MG: 100 INJECTION, SOLUTION INTRAMUSCULAR; INTRAVENOUS at 17:45

## 2022-01-01 RX ADMIN — LEVOFLOXACIN 250 MG: 5 INJECTION, SOLUTION INTRAVENOUS at 21:39

## 2022-01-01 RX ADMIN — AZTREONAM 2 G: 2 INJECTION, POWDER, LYOPHILIZED, FOR SOLUTION INTRAMUSCULAR; INTRAVENOUS at 12:20

## 2022-01-01 RX ADMIN — HYDROCORTISONE SODIUM SUCCINATE 50 MG: 100 INJECTION, POWDER, FOR SOLUTION INTRAMUSCULAR; INTRAVENOUS at 05:00

## 2022-01-01 RX ADMIN — SODIUM BICARBONATE 650 MG: 650 TABLET ORAL at 20:54

## 2022-01-01 RX ADMIN — ARFORMOTEROL TARTRATE 15 MCG: 15 SOLUTION RESPIRATORY (INHALATION) at 20:53

## 2022-01-01 RX ADMIN — PANTOPRAZOLE SODIUM 40 MG: 40 INJECTION, POWDER, FOR SOLUTION INTRAVENOUS at 08:30

## 2022-01-01 RX ADMIN — NOREPINEPHRINE BITARTRATE 30 MCG/MIN: 1 INJECTION, SOLUTION, CONCENTRATE INTRAVENOUS at 23:39

## 2022-01-01 RX ADMIN — MIDODRINE HYDROCHLORIDE 10 MG: 5 TABLET ORAL at 09:35

## 2022-01-01 RX ADMIN — DOXAZOSIN 2 MG: 2 TABLET ORAL at 14:08

## 2022-01-01 RX ADMIN — Medication 2 UNITS: at 23:31

## 2022-01-01 RX ADMIN — POTASSIUM CHLORIDE 20 MEQ: 29.8 INJECTION, SOLUTION INTRAVENOUS at 12:27

## 2022-01-01 RX ADMIN — POTASSIUM BICARBONATE 20 MEQ: 782 TABLET, EFFERVESCENT ORAL at 12:12

## 2022-01-01 RX ADMIN — DEXMEDETOMIDINE HYDROCHLORIDE 1 MCG/KG/HR: 400 INJECTION INTRAVENOUS at 04:15

## 2022-01-01 RX ADMIN — FOLIC ACID 1 MG: 1 TABLET ORAL at 09:24

## 2022-01-01 RX ADMIN — GUAIFENESIN 600 MG: 600 TABLET, EXTENDED RELEASE ORAL at 20:20

## 2022-01-01 RX ADMIN — MAGNESIUM SULFATE HEPTAHYDRATE 2 G: 40 INJECTION, SOLUTION INTRAVENOUS at 09:53

## 2022-01-01 RX ADMIN — Medication: at 18:28

## 2022-01-09 PROBLEM — A41.9 SEVERE SEPSIS (HCC): Status: ACTIVE | Noted: 2022-01-01

## 2022-01-09 PROBLEM — R65.20 SEVERE SEPSIS (HCC): Status: ACTIVE | Noted: 2022-01-01

## 2022-01-09 NOTE — ED TRIAGE NOTES
Triage: pt developed a UTI on [place] Day and was seen by [address]. Pt was given oral antibiotics to go home with but did not take any. Pt's sons noticed pt had increased confusion several days ago went they went over to refuel the generator. Today pt was more confused, staring at the wall and sent a \"gibberish text message. \" Denies fever at this time.

## 2022-01-10 NOTE — PROGRESS NOTES
Physical Therapy    Order acknowledged, chart reviewed, noted bedrest order. PT evaluation deferred per RN recommendation as pt not able to tolerate participation this date, continues to receive fluids for BP stabilization. Will con't to follow.     Zahra Colon, PT, MPT

## 2022-01-10 NOTE — ED NOTES
TRANSFER - OUT REPORT:    Verbal report given to Lupe Verma RN (name) on Ab Snider  being transferred to [facility] (unit) for routine progression of care       Report consisted of patients Situation, Background, Assessment and   Recommendations(SBAR). Information from the following report(s) SBAR, ED Summary and MAR was reviewed with the receiving nurse. Lines:   Peripheral IV 01/09/22 Right Antecubital (Active)   Site Assessment Clean, dry, & intact 01/09/22 2054   Phlebitis Assessment 0 01/09/22 2054   Infiltration Assessment 0 01/09/22 2054   Dressing Status Clean, dry, & intact 01/09/22 2054   Hub Color/Line Status Infusing 01/09/22 2054       Peripheral IV 44/39/57 Right Cephalic (Active)        Opportunity for questions and clarification was provided.       Patient transported with:  ALEXIA

## 2022-01-10 NOTE — H&P
Brief:    Admitted loopy in setting of incompletely treated UTI, now for cont rx in hospital setting. Lactic acid normalizes, ivf/bolus/ivf cont and Levaquin dosed by pharmacy on gong, cutl urine/blood neg ;pending to date  Wbc nl and pt afeb, alert appropriate non focal and asymptomatic. Has chronic obstructive uropathy  ( intol to [place]om[place])   Nydia Gilbert MD 1/10/2022           [facility]  History and Physical    Primary Care Provider: Josette Kemp MD  Date of Service:  1/11/2022    Chief Complaint: loopy     Subjective:     Anola Pain is a 80 y.o. male who presents with Admitted loopy in setting of incompletely treated UTI, now for cont rx in hospital setting. Lactic acid normalizes, ivf/bolus/ivf cont and Levaquin dosed by pharmacy on gong, cutl urine/blood neg ;pending to date  Wbc nl and pt afeb, alert appropriate non focal and asymptomatic. Has chronic obstructive uropathy  ( intol to [place])       O nset of symptoms was gradual with gradually worsening course since that time. The pain is located - no pain . Patient describes the pain as na  and rated as na . Pain has been associated with na. Patient denies na. Symptoms are aggravated by na. Symptoms improve withna. Previous studies include na  Pt notes drinking 2-3 coctails a day, CIWA started, thiamin. Pt denies h/o or current tremors/dt's . Review of Systems:    A comprehensive review of systems was negative except for that written in the History of Present Illness. Past Medical History:   Diagnosis Date    ETOH abuse     Hypertension     UTI (urinary tract infection)       Past Surgical History:   Procedure Laterality Date    HX ORTHOPAEDIC      LTHR    HX ORTHOPAEDIC      x3 fused neck vertebre    HX OTHER SURGICAL      R carotid and L thigh vessel cleaning     Prior to Admission medications    Medication Sig Start Date End Date Taking?  Authorizing Provider   clopidogreL (PLAVIX) 75 mg tab Take 75 mg by mouth daily. 3/3/21  Yes Other, MD Joseph   ferrous sulfate 325 mg (65 mg iron) tablet Take 1 Tablet by mouth two (2) times a day. 1/15/21  Yes Other, MD Joseph   atenoloL (TENORMIN) 50 mg tablet Take 50 mg by mouth daily. 10/11/21   Other, MD Joseph     Allergies   Allergen Reactions    Keflex [Cephalexin] Anaphylaxis      History reviewed. No pertinent family history. SOCIAL HISTORY:  Social History     Socioeconomic History    Marital status:      Spouse name: Not on file    Number of children: Not on file    Years of education: Not on file    Highest education level: Not on file   Occupational History    Not on file   Tobacco Use    Smoking status: Current Every Day Smoker    Smokeless tobacco: Not on file   Substance and Sexual Activity    Alcohol use: Yes    Drug use: Never    Sexual activity: Not on file   Other Topics Concern    Not on file   Social History Narrative    Not on file     Social Determinants of Health     Financial Resource Strain:     Difficulty of Paying Living Expenses: Not on file   Food Insecurity:     Worried About Running Out of Food in the Last Year: Not on file    Lakhwinder of Food in the Last Year: Not on file   Transportation Needs:     Lack of Transportation (Medical): Not on file    Lack of Transportation (Non-Medical):  Not on file   Physical Activity:     Days of Exercise per Week: Not on file    Minutes of Exercise per Session: Not on file   Stress:     Feeling of Stress : Not on file   Social Connections:     Frequency of Communication with Friends and Family: Not on file    Frequency of Social Gatherings with Friends and Family: Not on file    Attends Advent Services: Not on file    Active Member of Clubs or Organizations: Not on file    Attends Club or Organization Meetings: Not on file    Marital Status: Not on file   Intimate Partner Violence:     Fear of Current or Ex-Partner: Not on file    Emotionally Abused: Not on file  Physically Abused: Not on file    Sexually Abused: Not on file   Housing Stability:     Unable to Pay for Housing in the Last Year: Not on file    Number of Places Lived in the Last Year: Not on file    Unstable Housing in the Last Year: Not on file           Objective:       Physical Exam:   Visit Vitals  BP (!) 144/85 (BP Patient Position: Supine)   Pulse (!) 112   Temp 98.1 °F (36.7 °C)   Resp 23   Ht 5' 8.5\" (1.74 m)   Wt 46.2 kg (101 lb 13.6 oz)   SpO2 97%   BMI 15.26 kg/m²     General:  Alert, cooperative, no distress, appears stated age. Head:  Normocephalic, without obvious abnormality, atraumatic. Eyes:  Conjunctivae/corneas clear. PERRL, EOMs intact. Ears:  Normal   external ear  both ears. Nose: Nares normal. Septum midline. Mucosa normal. No drainage or sinus tenderness. Throat: Lips, mucosa, and tongue normal.     Neck: Supple, symmetrical, trachea midline, no adenopathy, thyroid: no enlargement/tenderness/nodules, no carotid bruit and no JVD. Back:   Symmetric, no curvature. ROM normal. No CVA tenderness. Lungs:   Clear to auscultation bilaterally. Chest wall:  No tenderness or deformity. Heart:  Regular rate and rhythm, S1, S2 normal, no murmur, click, rub or gallop. Abdomen:   Soft, non-tender. Bowel sounds normal. No masses,  No organomegaly. Extremities: Extremities normal, atraumatic, no cyanosis or edema. Pulses: 1+ and symmetric all extremities. Skin: Skin color, texture, turgor normal. No rashes or lesions   Lymph nodes: Cervical, supraclavicular, and axillary nodes normal.   Neurologic: CNII-XII intact. Normal strength, sensation and reflexes throughout. Cap refill: Brisk, less than 3 seconds  Pulses: 1+, symmetric in all extremities  Skin: Warm, dry, without rashes or lesions     Data Review: All diagnostic labs and studies have been reviewed. Chest x-ray was negative for infiltrate, effusion, pneumothorax, or wide mediastinum.     Assessment: Active Problems:    Severe sepsis ([facility] [address] (1/9/2022)      UTI   Met acidosis  Alcohol - excess use   Plan:     1. Abx. Ivf/monitor, f/u lab. Cult   2. Thiamin, CIWA  3. PT/OT as can miguel   4. Replace electrolytes. Mag  5. F/u on bicab       FUNCTIONAL STATUS PRIOR TO HOSPITALIZATION Ambulates Independently (including history of recent falls):       Signed By: Yanet Pop MD     January 11, 2022

## 2022-01-10 NOTE — PROGRESS NOTES
Lovenox Monitoring  Indication: DVT Prophylaxis  Recent Labs     01/09/22 1921   HGB 11.4*   *   CREA 1.80*     Current Weight: 40.7 kg  Est. CrCl = 22 ml/min  Current Dose: 40 mg subcutaneously every 24 hours. Plan: Change to 30mg SQ every 24 hours  per [facility] P&T Committee Protocol with respect to renal function. Pharmacy will continue to monitor patient daily and will make dosage adjustments based upon changing renal function.

## 2022-01-10 NOTE — ED NOTES
Per bedside report pt was cathed x2 to obtain urine sampled and was unsuccessful. MD was made aware.

## 2022-01-10 NOTE — PROGRESS NOTES
Occupational Therapy:    Chart reviewed and attempted to see for OT session, however per RN patient likely not able to tolerate session, with labile BP receiving fluids, and active bedrest orders. Will continue to follow as appropriate.     Joanne Can, OT

## 2022-01-10 NOTE — ROUTINE PROCESS
Pt with poor urine output despite fluids. Bladder scanned pt x2. Less than 180. ED attempted to put in de la rosa unsuccessfully- couldn't advance catheter. Pt states he pees only 3x day- this is his normal. Urine is dark altaf. Blood pressure normalized. Held afternoon midodrine.  Updated son a bedside- awaiting MD

## 2022-01-10 NOTE — ED PROVIDER NOTES
72-year-old male presents from home via private vehicle accompanied by his son with a complaint of confusion. The son states that the patient was diagnosed with a UTI on [place] [facility].  He was treated with antibiotics and fluids and discharged home from the ED. He may have taken a couple days worth of the antibiotics but then stopped. Over the past couple of days the patient has seemed more confused. Today he was pretty lethargic and was not getting out of bed until 1:00 in the afternoon. He was also confusing his sons and sending text messages that were gibberish which are unusual behaviors for him. There is no known fever at home. Is not had any vomiting at the patient son is aware of. He has been having diarrhea and was soiled with stool. Patient not expressed any complaints at this time. Past Medical History:   Diagnosis Date    ETOH abuse     Hypertension     UTI (urinary tract infection)        Past Surgical History:   Procedure Laterality Date    HX ORTHOPAEDIC      LTHR    HX ORTHOPAEDIC      x3 fused neck vertebre    HX OTHER SURGICAL      R carotid and L thigh vessel cleaning         History reviewed. No pertinent family history. Social History     Socioeconomic History    Marital status: Not on file     Spouse name: Not on file    Number of children: Not on file    Years of education: Not on file    Highest education level: Not on file   Occupational History    Not on file   Tobacco Use    Smoking status: Current Every Day Smoker    Smokeless tobacco: Not on file   Substance and Sexual Activity    Alcohol use:  Yes    Drug use: Never    Sexual activity: Not on file   Other Topics Concern    Not on file   Social History Narrative    Not on file     Social Determinants of Health     Financial Resource Strain:     Difficulty of Paying Living Expenses: Not on file   Food Insecurity:     Worried About [address] in the Last Year: Not on file    Ran Out of Food in the Last Year: Not on file   Transportation Needs:     Lack of Transportation (Medical): Not on file    Lack of Transportation (Non-Medical): Not on file   Physical Activity:     Days of Exercise per Week: Not on file    Minutes of Exercise per Session: Not on file   Stress:     Feeling of Stress : Not on file   Social Connections:     Frequency of Communication with Friends and Family: Not on file    Frequency of Social Gatherings with Friends and Family: Not on file    Attends Lutheran Services: Not on file    Active Member of [address] [place] [address] [employer] or Organizations: Not on file    Attends Club or Organization Meetings: Not on file    Marital Status: Not on file   Intimate Partner Violence:     Fear of Current or Ex-Partner: Not on file    Emotionally Abused: Not on file    Physically Abused: Not on file    Sexually Abused: Not on file   Housing Stability:     Unable to Pay for Housing in the Last Year: Not on file    Number of [place] in the Last Year: Not on file    Unstable Housing in the Last Year: Not on file         ALLERGIES: Keflex [cephalexin]    Review of Systems       Vitals:    01/09/22 1858   BP: (!) 147/89   Pulse: (!) 118   Resp: 16   Temp: 97.5 °F (36.4 °C)   SpO2: 94%   Weight: 41.2 kg (90 lb 13.3 oz)            Physical Exam  Vitals and nursing note reviewed. Constitutional:       General: He is in acute distress. Appearance: He is well-developed. He is ill-appearing. Comments: Malodorous and covered in stool   HENT:      Head: Normocephalic and atraumatic. Eyes:      Pupils: Pupils are equal, round, and reactive to light. Cardiovascular:      Rate and Rhythm: Tachycardia present. Pulmonary:      Effort: Pulmonary effort is normal. No respiratory distress. Abdominal:      General: There is no distension. Musculoskeletal:         General: Normal range of motion. Cervical back: Normal range of motion and neck supple.    Skin: General: Skin is warm and dry. Neurological:      Mental Status: He is alert and oriented to person, place, and time. MDM  Number of Diagnoses or Management Options     Amount and/or Complexity of Data Reviewed  Clinical lab tests: reviewed      ED Course as of 01/12/22 0953   Mon Yehuda 10, 2022   0039 Contacted by nursing due to hospitalist not responding and patient BP at 80systolic. Second bolus of normal saline ordered. [[place]]      ED Course User Index  [NY] Hue Ortega MD     Code Sepsis Reassessment & Plan    - Sepsis order set entered: YES  - Broad Spectrum Antibiotics given:  Levofloxacin  - Repeat lactic acid ordered for time 2017  - Re-assessment performed at time 2017 and clinical condition stable. - Actions taken: abx ordered. - Hypotension or Lactic Acidosis present (SBP<90, MAP<65, Lactate >4): NO   - IVF: Not indicated due to septic shock not present  - Persistent Septic Shock present (Hypotension despite IVF resuscitation): NO  Vasopressors: Not indicated due to septic shock not present  - Disposition: Admit to Step down     8:36 PM  Patient proving difficult IV access. He had 2 peripheral IVs the blue. I just placed a 20-gauge under ultrasound guidance and hopefully this will allow us to start infusing IV fluids so we can get a urine sample. Procedures    8:36 PM  IV ACCESS WITH ULTRASOUND GUIDANCE  Will Tomas MD gained IV access using  20 gauge needle. Indication is vascular access could not be obtained. The site was cleansed with an alcohol prep, the L antecubital vein was localized with ultrasound guidance in an anterior approach. Line confirmation was obtained by direct visualization and good blood return. No anaesthetic was used. The line was successfully flushed with normal saline and was secured with transparent tape. The patient tolerated the procedure well. There were no complications.       9:24 PM  IV ACCESS WITH ULTRASOUND GUIDANCE  Will Tomas MD gained IV access using  20 gauge needle. Indication is vascular access could not be obtained. The site was cleansed with an alcohol prep, the L antecubital vein was localized with ultrasound guidance in an anterior approach. Line confirmation was obtained by direct visualization and good blood return. No anaesthetic was used. The line was successfully flushed with normal saline and was secured with transparent tape. The patient tolerated the procedure well. There were no complications. Perfect Serve Consult for Admission  10:00 PM    ED Room Number: SER06/06  Patient Name and age:  Spike Sizer 80 y.o.  male  Working Diagnosis:   1. Severe sepsis (HCC)    2. Dehydration    3. Renal insufficiency    4. Hyponatremia        COVID-19 Suspicion:  no  Sepsis present:  yes  Reassessment needed: no  Code Status:  Full Code  Readmission: no  Isolation Requirements:  no  Recommended Level of Care:  telemetry  Department:  [facility] - [phone]    Other:  Septic with tachycardia, elevated cr, lactate=2.8. COVID neg. Likely UTI but have been unable to obtain urine. Will continue to try. Ceftriaxone and IVF ordered. Total critical care time spent exclusive of procedures:  45 minutes.

## 2022-01-10 NOTE — PROGRESS NOTES
NANCY- Home . Reason for Admission:   //confusion                    RUR Score:  12%                PCP: First and Last name:   Wilfredo Reynoso MD     Name of Practice:    Are you a current patient: Yes/No:    Approximate date of last visit:    Can you participate in a virtual visit if needed:     Do you (patient/family) have any concerns for transition/discharge? No                   Plan for utilizing home health:   TBD    Current Advanced Directive/Advance Care Plan:  Full Code      Healthcare Decision Maker:   Click here to complete [address] including selection of the Healthcare Decision Maker Relationship (ie \"Primary\")              Transition of Care Plan:      CM met with pt and his son at pt's bedside. CM introduced him to the role of CM and transition of care. Pt lives with a roommate in a one story home with no steps to enter. He has a ramp for the home. His roommate does not help him with any self care tasks. This pt has a walker, rollator, cane BSC and tub bench at home. At baseline, he uses his walker. CM will follow for d/c needs. [facility] Management Interventions  PCP Verified by CM:  Yes  Palliative Care Criteria Met (RRAT>21 & CHF Dx)?: No  Transition of Care Consult (CM Consult): Discharge Planning  MyChart Signup: No  Discharge Durable Medical Equipment: No  Physical Therapy Consult: No  Occupational Therapy Consult: No  Speech Therapy Consult: No  Support Systems: Child(catie)  Confirm Follow Up Transport: Family  The Plan for Transition of Care is Related to the Following Treatment Goals : safe d/c  The Patient and/or Patient Representative was Provided with a Choice of Provider and Agrees with the Discharge Plan?: Yes  Freedom of Choice List was Provided with Basic Dialogue that Supports the Patient's Individualized Plan of Care/Goals, Treatment Preferences and Shares the Quality Data Associated with the Providers?: No  The [employer] Information Provided?: No

## 2022-01-10 NOTE — ED NOTES
Hospitalist paged and ED physician made aware of pt pressures. ED physician ordered 2nd bolus.  Awaiting for return call from hospitalist.

## 2022-01-10 NOTE — PROGRESS NOTES
9114 TRANSFER - IN REPORT:    Verbal report received from [address] (name) on Mikey Chavis  being received from [place] ED (unit) for routine progression of care      Report consisted of patients Situation, Background, Assessment and   Recommendations(SBAR). Information from the following report(s) SBAR, Kardex, Intake/Output, MAR, Recent Results and Cardiac Rhythm NSR, ST was reviewed with the receiving nurse. Opportunity for questions and clarification was provided. Assessment completed upon patients arrival to unit and care assumed. 0730 Bedside and Verbal shift change report given to Sommer Mohamud RN (oncoming nurse) by Cherylene Due, RN (offgoing nurse).  Report included the following information SBAR, Kardex, Intake/Output, MAR, Recent Results and Cardiac Rhythm NSR, ST.

## 2022-01-10 NOTE — WOUND CARE
WOCN Note:     New consult placed for assessment of buttocks and sacrum. Assessed in room 457. Chart reviewed. Admitted DX:  Severe sepsis  Past Medical History:   Diagnosis Date    ETOH abuse     Hypertension     UTI (urinary tract infection)      Assessment:   Patient is A&O x 3, communicative, continent and requires assist with repositioning. Bed: prius air mattress  Patient reports no pain. Patient repositioned on left side with pillow. Heels offloaded with pillows. Heels intact without erythema. 1. POA Sacrum, Pressure injury stage 1: 0.5 x 0.5 x 0 cm  100% non blanching red. Periwound with blanching red erythema. Applied sacral foam dressing. 2.  POA Buttocks, Denudation from MASD:  100% pink. Cleansed and applied Zinc cream.    Wound, Pressure Prevention & Skin Care Recommendations:    1. Minimize layers of linen/pads under patient to optimize support surface. 2.  Turn/reposition approximately every 2 hours and offload heels. 3.  Manage moisture/ Keep skin folds clean and dry. 4.  Sacrum:  Venelex BID and sacral foam  5. Buttocks and perineum:  Zinc cream BID    Discussed above plan with patient and [facility].     Transition of Care:   Plan to follow as needed while admitted to hospital.    LIBRA DaoN RN [facility] [facility]  Available on Perfect Serve  Pager [phone]  Office [phone]

## 2022-01-10 NOTE — ED NOTES
+diarrhea. Pt cleaned. Excoriation noted to sacrum/scrotum; barrier cream placed and new brief. Noted BLE edema when socks removed; new socks placed. Warm blankets placed on pt.

## 2022-01-10 NOTE — PROGRESS NOTES
Renal Dosing/Monitoring  Medication: UTI  Current regimen:  rx to dose x 7 days  Recent Labs     01/09/22 1921   CREA 1.80*   BUN 26*     Estimated CrCl:  22 ml/min  Plan: will begin 250 mg iv q24h for crcl >20 x 5 days. First dose 750 mg 1/9 @ 21:00.   Dose due 1/11

## 2022-01-10 NOTE — PROGRESS NOTES
Comprehensive Nutrition Assessment    Type and Reason for Visit: Initial,Positive nutrition screen    Nutrition Recommendations/Plan:     1.  RD has adjusted diet to easy to chew (pt has few if any teeth)    2. RD has added several supplements for pt to try    3. Recommend goody bag and/or [place] MVI 30 ml daily (pt has h/o significant hard liquor intake)    4. Reduce IV rate and replace with goody bag    5. Replete K and Mg; please add BMP, Phos and Mg to daily labs      Nutrition Assessment:  Pt was admitted with untreated UTI. He was brought to the ER as he was confused/disoriented. Pt also has a h/o of chronic obstructive uropathy. Pt's sone reports that his dad's usual weight had been around 115 pounds, pt's alcohol intake has definitely affected pt's appetite. Visited pt this afternoon, pt's son was also in the room. Pt very pleasant and conversant; he told me that he drinks a lot of alcohol \"which is probably why I don't have any appetite to eat food. \"  Pt reported to me that he drinks x3  8-ounce tumblers of hard liquor daily. Pt is clearly malnourished (meets criteria for severe malnutrition), labs reviewed and many electrolyte abnormalities noted. Pt is a high risk for refeeding syndrome as well--would not push him to eat large amounts at a time until his electrolytes have been repleted and have normalized. Pt loves jello, oatmeal, doesn't really like \"milky supplements. \"  Son reports that pt has been eating very little; I told the son that I will add a few different supplements for pt to try, will see how he does with these. RD will continue to follow.     Malnutrition Assessment:  Malnutrition Status:  Severe malnutrition    Context:  Acute illness     Findings of the 6 clinical characteristics of malnutrition:   Energy Intake:  7 - 50% or less of est energy requirements for 5 or more days  Weight Loss:  1.00 - 7.5% over 3 months     Body Fat Loss:  7 - Moderate body fat loss, Buccal region,Orbital,Triceps   Muscle Mass Loss:  7 - Moderate muscle mass loss, Clavicles (pectoralis & deltoids),Hand (interosseous),Temples (temporalis)  Fluid Accumulation:  No significant fluid accumulation,     Strength:  Not performed       Estimated Daily Nutrient Needs:  Energy (kcal): ~ 4528-3112 kcals (MSJ x 1.3-1.4 for wt gain); Weight Used for Energy Requirements: Current  Protein (g): 1.5-1.7 gm pro/kg actual wt; Weight Used for Protein Requirements: Current  Fluid (ml/day): 1 ml/kcal; Method Used for Fluid Requirements: 1 ml/kcal      Nutrition Related Findings:  Last BM was 1/9      Wounds:    None       Current Nutrition Therapies:  ADULT DIET Regular  DIET ONE TIME MESSAGE    Anthropometric Measures:  · Height:  5' 8.5\" (174 cm)  · Current Body Wt:  40.7 kg (89 lb 11.6 oz)   · Admission Body Wt:  89 lb 11.6 oz    · Usual Body Wt:        · Ideal Body Wt:  157 lbs:  57.2 %   · Adjusted Body Weight:   ; Weight Adjustment for: No adjustment   · Adjusted BMI:       · BMI Category:  Underweight (BMI less than 22) age over 72       Wt Readings from Last 15 Encounters:   01/10/22 40.7 kg (89 lb 11.6 oz)       Nutrition Diagnosis:   · Inadequate oral intake related to inadequate protein-energy intake as evidenced by BMI,severe muscle loss,severe loss of subcutaneous fat      Nutrition Interventions:   Food and/or Nutrient Delivery: Start oral nutrition supplement,Modify current diet  Nutrition Education and Counseling: No recommendations at this time  Coordination of Nutrition Care: Continue to monitor while inpatient    Goals: Tolerance of at least 50% of meals and supplements over the next week       Nutrition Monitoring and Evaluation:   Behavioral-Environmental Outcomes: Readiness for change  Food/Nutrient Intake Outcomes: Food and nutrient intake,Supplement intake  Physical Signs/Symptoms Outcomes: Chewing or swallowing,GI status,Weight    Discharge Planning:     Too soon to determine     Electronically signed by Yogesh Arita RD, CSP on 1/10/2022 at 3:43 PM    Contact: via [address] [place] [address]

## 2022-01-11 NOTE — PROGRESS NOTES
Problem: Self Care Deficits Care Plan (Adult)  Goal: *Acute Goals and Plan of Care (Insert Text)  Description: FUNCTIONAL STATUS PRIOR TO ADMISSION: Patient was modified independent using a walker for functional mobility. Patient was modified independent for basic and instrumental ADLs. Uses a tub transfer bench and grab bars in shower, raised toilet seat for toileting. HOME SUPPORT: The patient lived with a roommate but did not require assist. Patient has sons that live locally. Occupational Therapy Goals  Initiated 1/11/2022  1. Patient will perform grooming standing at sink with supervision/set-up within 7 day(s). 2.  Patient will perform lower body dressing with modified independence within 7 day(s). 3.  Patient will perform bathing with supervision/set-up within 7 day(s). 4.  Patient will perform toilet transfers with modified independence within 7 day(s). 5.  Patient will perform all aspects of toileting with supervision/set-up within 7 day(s). 6.  Patient will participate in upper extremity therapeutic exercise/activities with independence for 10 minutes within 7 day(s). 7.  Patient will utilize energy conservation techniques during functional activities with verbal cues within 7 day(s). Outcome: Not Met   OCCUPATIONAL THERAPY EVALUATION  Patient: Diantha Paget (6 y.o. male)  Date: 1/11/2022  Primary Diagnosis: Severe sepsis ([facility] [A41.9, R65.20]        Precautions: falls    ASSESSMENT  Based on the objective data described below, the patient presents with decreased activity tolerance, orthostatic hypotension, generalized decreased strength, complaints of dizziness with positional changes (patient reports this happens at baseline as well), increased need for assist with self care and functional mobility/transfers. Patient semi supine in bed upon OT/PT arrival and agreeable to participate with therapy.  Noted that patient's pad was wet, pt's IV out in right upper extremity with pump going so sheets and gown wet. Patient sup -> sit and then sit -> stand, unable to remain standing to obtain BP in standing but patient reporting increased dizziness, see below for BP. Patient was returned to supine once sitting BP obtained, nursing in room and aware, switched off IV pump and patient min A to change gown with assist for line management. Patient setup assist feeding as patient taking pills with HOB raised. Patient would benefit from skilled OT services during admission to improve independence with self care and functional mobility/transfers. Recommend discharge to SNF at this time as patient is far from functional baseline of modified independence and with orthostatic hypotension. Time: 7847 1637 3082 1200   Heart Rate:  114 127 112   BP: 120/41 92/55 58/41 144/85   Patient position: Supine  Sitting  Sitting after standing Supine        Current Level of Function Impacting Discharge (ADLs/self-care): setup to min A upper extremity activities of daily living, min to mod A lower extremity activities of daily living, min A x1-2 for mobility and transfers    Functional Outcome Measure: The patient scored Total: 45/100 on the Barthel Index outcome measure which is indicative of being partially dependent in basic self-care. Other factors to consider for discharge: time since onset, severity of deficits, prior level of function, uses a RW at baseline     Patient will benefit from skilled therapy intervention to address the above noted impairments. PLAN :  Recommendations and Planned Interventions: self care training, functional mobility training, therapeutic exercise, balance training, therapeutic activities, endurance activities, patient education, home safety training and family training/education    Frequency/Duration: Patient will be followed by occupational therapy 5 times a week to address goals.     Recommendation for discharge: (in order for the patient to meet his/her long term goals)  Therapy up to 5 days/week in SNF setting    This discharge recommendation:  Has been made in collaboration with the attending provider and/or case management    IF patient discharges home will need the following DME: increased caregiver assist       SUBJECTIVE:   Patient stated I have people renting a room sometimes.     OBJECTIVE DATA SUMMARY:   HISTORY:   Past Medical History:   Diagnosis Date    ETOH abuse     Hypertension     UTI (urinary tract infection)      Past Surgical History:   Procedure Laterality Date    HX ORTHOPAEDIC      LTHR    HX ORTHOPAEDIC      x3 fused neck vertebre    HX OTHER SURGICAL      R carotid and L thigh vessel cleaning       Expanded or extensive additional review of patient history:     Home Situation  Home Environment: Trailer/mobile home  # Steps to Enter: 0  Wheelchair Ramp: Yes  Living Alone: No (renter lives with)  Support Systems: Child(catie)  Current DME Used/Available at Home: Joe Tsang, rolling,Tub transfer bench,Grab bars,Raised toilet seat  Tub or Shower Type: Tub/Shower combination    EXAMINATION OF PERFORMANCE DEFICITS:  Cognitive/Behavioral Status:  Neurologic State: Alert  Orientation Level: Oriented X4  Cognition: Follows commands        Safety/Judgement: Fall prevention;Decreased insight into deficits    Skin: general bruising, no open spots noted where visualized    Edema: right upper extremity swelling    Hearing:   Auditory  Auditory Impairment: None    Vision/Perceptual:                                     Range of Motion:  AROM: Generally decreased, functional                         Strength:  Strength: Generally decreased, functional                Coordination:  Coordination: Within functional limits            Tone & Sensation:  Tone: Normal  Sensation: Intact                      Balance:  Sitting: Intact  Standing: Impaired  Standing - Static: Fair  Standing - Dynamic : Fair    Functional Mobility and Transfers for ADLs:  Bed Mobility:  Supine to Sit: Minimum assistance;Contact guard assistance; Additional time  Sit to Supine: Minimum assistance;Assist x2  Scooting: Moderate assistance (to complete to EOB)    Transfers:  Sit to Stand: Minimum assistance  Stand to Sit: Contact guard assistance  Toilet Transfer : Minimum assistance;Assist x2 (infer, unsafe to complete at this time due to BP)  Assistive Device : Gait Belt;Walker, rolling    ADL Assessment:  Feeding: Setup    Oral Facial Hygiene/Grooming: Setup (infer from functional reach and strength)    Bathing: Minimum assistance (infer from functional reach and LE access)    Upper Body Dressing: Minimum assistance (assist with line management)    Lower Body Dressing: Moderate assistance (infer from functional reach and LE access)    Toileting: Minimum assistance (infer from functional reach and balance)                ADL Intervention and task modifications:  Feeding  Container Management: Set-up  Drink to Mouth: Independent                   Upper Body [address]: Minimum  assistance              Cognitive Retraining  Safety/Judgement: Fall prevention;Decreased insight into deficits    Functional Measure:    Barthel Index:  Bathin  Bladder: 5  Bowels: 10  Groomin  Dressin  Feedin  Mobility: 0  Stairs: 0  Toilet Use: 5  Transfer (Bed to Chair and Back): 10  Total: 45/100      The Barthel ADL Index: Guidelines  1. The index should be used as a record of what a patient does, not as a record of what a patient could do. 2. The main aim is to establish degree of independence from any help, physical or verbal, however minor and for whatever reason. 3. The need for supervision renders the patient not independent. 4. A patient's performance should be established using the best available evidence. Asking the patient, friends/relatives and nurses are the usual sources, but direct observation and common sense are also important. However direct testing is not needed.   5. Usually the patient's performance over the preceding 24-48 hours is important, but occasionally longer periods will be relevant. 6. Middle categories imply that the patient supplies over 50 per cent of the effort. 7. Use of aids to be independent is allowed. Score Interpretation (from [facility])    Independent   60-79 Minimally independent   40-59 Partially dependent   20-39 Very dependent   <20 Totally dependent     -Wellington Pederson., BarthelJOSE. (1965). Functional evaluation: the Barthel Index. [address] ([address] (1997). The Barthel activities of daily living index: self-reporting versus actual performance in the old (> or = 75 years). Journal of [address] [place] [address] 45(7), [facility], MORA, Shabbir Lewis., Storm Kishan. (1999). Measuring the change in disability after inpatient rehabilitation; comparison of the responsiveness of the Barthel Index and Functional [place] Measure. Journal of Neurology, Neurosurgery, and Psychiatry, 66(4), 944-323. Harsh Salinas, N.J.A, PRIMITIVO Wynn, & Carly Bejarano M.A. (2004) Assessment of post-stroke quality of life in cost-effectiveness studies: The usefulness of the Barthel Index and the EuroQoL-5D. Quality of Life Research, 15, 934-41     Occupational Therapy Evaluation Charge Determination   History Examination Decision-Making   MEDIUM Complexity : Expanded review of history including physical, cognitive and psychosocial  history  MEDIUM Complexity : 3-5 performance deficits relating to physical, cognitive , or psychosocial skils that result in activity limitations and / or participation restrictions MEDIUM Complexity : Patient may present with comorbidities that affect occupational performnce.  Miniml to moderate modification of tasks or assistance (eg, physical or verbal ) with assesment(s) is necessary to enable patient to complete evaluation       Based on the above components, the patient evaluation is determined to be of the following complexity level: MEDIUM  Pain Ratin/10 reported    Activity Tolerance:   Poor, requires rest breaks and signs and symptoms of orthostatic hypotension    After treatment patient left in no apparent distress:    Supine in bed, Call bell within reach, Bed / chair alarm activated and Side rails x 3    COMMUNICATION/EDUCATION:   The patients plan of care was discussed with: Physical therapist and Registered nurse. Patient/family have participated as able in goal setting and plan of care. and Patient/family agree to work toward stated goals and plan of care. This patients plan of care is appropriate for delegation to [facility].     Thank you for this referral.  Delmi River, OTR/L  Time Calculation: 30 mins

## 2022-01-11 NOTE — PROGRESS NOTES
Occupational therapy  01/11/22     OT eval order received and acknowledged. OT eval attempted at 9:58 AM, nursing present in room and patient eating breakfast. Requesting therapy come back later after patient has finished eating. Will continue to follow patient and attempt OT eval at a later time.      Thank you,  Andrew Bello, OTR/L

## 2022-01-11 NOTE — PROGRESS NOTES
Lovenox Monitoring  Indication: DVT Prophylaxis  Recent Labs     01/11/22  0508 01/09/22  1921   HGB 8.2* 11.4*   * 147*   CREA 0.88 1.80*     Current Weight: 46.2 kg  Est. CrCl = 51 ml/min  Current Dose: 30 mg subcutaneously every 24 hours. Plan: Change to 40mg SQ every 24 hours  per [facility] P&T Committee Protocol with respect to renal function. Pharmacy will continue to monitor patient daily and will make dosage adjustments based upon changing renal function.

## 2022-01-11 NOTE — PROGRESS NOTES
Problem: Pressure Injury - Risk of  Goal: *Prevention of pressure injury  Description: Document Virgil Scale and appropriate interventions in the flowsheet. Outcome: Progressing Towards Goal  Note: Pressure Injury Interventions:       Moisture Interventions: Absorbent underpads,Check for incontinence Q2 hours and as needed,Minimize layers    Activity Interventions: Assess need for specialty bed    Mobility Interventions: Assess need for specialty bed,HOB 30 degrees or less,Pressure redistribution bed/mattress (bed type)    Nutrition Interventions: Document food/fluid/supplement intake,Discuss nutritional consult with provider,Offer support with meals,snacks and hydration    Friction and Shear Interventions: Apply protective barrier, creams and emollients,HOB 30 degrees or less,Lift sheet,Minimize layers                Problem: Falls - Risk of  Goal: *Absence of Falls  Description: Document Skip Fall Risk and appropriate interventions in the flowsheet.   Outcome: Progressing Towards Goal  Note: Fall Risk Interventions:  Mobility Interventions: Bed/chair exit alarm,Communicate number of staff needed for ambulation/transfer,Patient to call before getting OOB,Strengthening exercises (ROM-active/passive),Utilize walker, cane, or other assistive device    Mentation Interventions: Adequate sleep, hydration, pain control,Bed/chair exit alarm,Door open when patient unattended,Evaluate medications/consider consulting pharmacy,Eyeglasses and hearing aids,Gait belt with transfers/ambulation,Increase mobility,More frequent rounding,Reorient patient,Room close to nurse's station,Self-releasing belt,Toileting rounds,Update white board    Medication Interventions: Bed/chair exit alarm,Evaluate medications/consider consulting pharmacy,Patient to call before getting OOB,Teach patient to arise slowly         History of Falls Interventions: Bed/chair exit alarm

## 2022-01-11 NOTE — ROUTINE PROCESS
MD Herb Shane notified of pt morning labs. Son expresses concerns about pt ams. Pt states that if he cannot make decisions for himself he would like his son Sara Renae to make his decisions. Notified Mandy Akers for advanced directive. Pt still with very poor urine output.

## 2022-01-11 NOTE — PROGRESS NOTES
[facility] Adult  Hospitalist Group                                                                                          Hospitalist Progress Note  Ro Lazaro MD  Answering service: [phone] OR [phone] from in house phone        Date of Service:  2022  NAME:  Aaron Dunlap  :  1950  MRN:  [id number]      Admission Summary:   Copied form admit: \" Admitted loopy in setting of incompletely treated UTI, now for cont rx in hospital setting. Lactic acid normalizes, ivf/bolus/ivf cont and Levaquin dosed by pharmacy on gong, cutl urine/blood neg ;pending to date  Wbc nl and pt afeb, alert appropriate non focal and asymptomatic. Has chronic obstructive uropathy  ( intol to FLomax)   Ro Lazaro MD 1/10/2022  \"    Interval history / Subjective:     2022 :    Feels better    Non s/s of withdrawal    Librium added 25 mg tid    Cont on ivf/abx/CIWA    Case discussed w pt and son in room    Na Bicar tabs added   K/mag replaced   Leny Gordon as below        Assessment & Plan:       UTI  - ivf/cult/abx    Alcohol excess use  - CIWA  - Repelace electrolytes  - Librium  - Thiamine    dys-electroltyemia   - replace K, Mag  - f/u lab        Hospital Problems  Never Reviewed          Codes Class Noted POA    Severe sepsis ([facility] ICD-10-CM: A41.9, R65.20  ICD-9-CM: 038.9, 995.92  2022 Unknown                  After personally interviewing pt at bedside the following is noted on 2022 :    Review of Systems   Constitutional: Negative for chills and fever. Neurological: Positive for weakness. Negative for dizziness and focal weakness. Psychiatric/Behavioral: Negative. All other systems reviewed and are negative.              I had a face to face encounter with patient on 2022 at bedside for the following physical exam:     PHYSICAL EXAM:    Visit Vitals  BP (!) 144/85 (BP Patient Position: Supine)   Pulse (!) 112   Temp 98.1 °F (36.7 °C)   Resp 23   Ht 5' 8.5\" (1.74 m)   Wt 46.2 kg (101 lb 13.6 oz)   SpO2 97%   BMI 15.26 kg/m²          Physical Exam  Constitutional:       Appearance: Normal appearance. HENT:      Head: Normocephalic and atraumatic. Right Ear: External ear normal.      Left Ear: External ear normal.      Nose: Nose normal.      Mouth/Throat:      Mouth: Mucous membranes are moist.      Pharynx: Oropharynx is clear. Eyes:      Extraocular Movements: Extraocular movements intact. Pupils: Pupils are equal, round, and reactive to light. Cardiovascular:      Rate and Rhythm: Normal rate and regular rhythm. Pulmonary:      Effort: Pulmonary effort is normal.      Breath sounds: Normal breath sounds. Abdominal:      General: Abdomen is flat. Bowel sounds are normal.      Palpations: Abdomen is soft. Musculoskeletal:         General: Tenderness present. No swelling, deformity or signs of injury. Cervical back: Neck supple. Skin:     General: Skin is warm and dry. Neurological:      Mental Status: He is alert and oriented to person, place, and time. Mental status is at baseline. Psychiatric:         Mood and Affect: Mood normal.         Behavior: Behavior normal.                     Data Review:    Review and/or order of clinical lab test      Labs:     Recent Labs     01/11/22 0508 01/09/22 1921   WBC 3.5* 7.8   HGB 8.2* 11.4*   HCT 26.1* 33.8*   * 147*     Recent Labs     01/11/22 0508 01/09/22 1921    127*   K 2.9* 3.1*   * 89*   CO2 11* 19*   BUN 17 26*   CREA 0.88 1.80*   GLU 61* 111*   CA 7.7* 9.5   MG  --  1.2*     Recent Labs     01/09/22 1921   ALT 15   AP 76   TBILI 1.2*   TP 7.6   ALB 2.9*   GLOB 4.7*     No results for input(s): INR, PTP, APTT, INREXT in the last 72 hours. No results for input(s): FE, TIBC, PSAT, FERR in the last 72 hours. No results found for: FOL, RBCF   No results for input(s): PH, PCO2, PO2 in the last 72 hours. No results for input(s): CPK, CKNDX, TROIQ in the last 72 hours.     No lab exists for component: CPKMB  No results found for: CHOL, CHOLX, CHLST, CHOLV, HDL, HDLP, LDL, LDLC, DLDLP, TGLX, TRIGL, TRIGP, CHHD, CHHDX  No results found for: GLUCPOC  Lab Results   Component Value Date/Time    Color PINK 01/10/2022 03:30 AM    Appearance CLOUDY (A) 01/10/2022 03:30 AM    Specific gravity 1.025 01/10/2022 03:30 AM    pH (UA) 6.0 01/10/2022 03:30 AM    Protein TRACE (A) 01/10/2022 03:30 AM    Glucose Negative 01/10/2022 03:30 AM    Ketone TRACE (A) 01/10/2022 03:30 AM    Urobilinogen 1.0 01/10/2022 03:30 AM    Nitrites Negative 01/10/2022 03:30 AM    Leukocyte Esterase TRACE (A) 01/10/2022 03:30 AM    Epithelial cells FEW 01/10/2022 03:30 AM    Bacteria Negative 01/10/2022 03:30 AM    WBC 0-4 01/10/2022 03:30 AM    RBC  01/10/2022 03:30 AM         Medications Reviewed:     Current Facility-Administered Medications   Medication Dose Route Frequency    cholecalciferol (VITAMIN D3) (400 Units /10 mcg) tablet 400 Units  400 Units Oral DAILY    sodium bicarbonate tablet 650 mg  650 mg Oral TID    potassium chloride SR (KLOR-CON 10) tablet 20 mEq  20 mEq Oral BID    chlordiazePOXIDE (LIBRIUM) capsule 25 mg  25 mg Oral TID    [START ON 1/12/2022] enoxaparin (LOVENOX) injection 40 mg  40 mg SubCUTAneous Q24H    influenza vaccine 2021-22 (6 mos+)(PF) (FLUARIX/FLULAVAL/FLUZONE QUAD) injection 0.5 mL  1 Each IntraMUSCular PRIOR TO DISCHARGE    0.9% sodium chloride infusion  50 mL/hr IntraVENous CONTINUOUS    levoFLOXacin (LEVAQUIN) 250 mg in D5W IVPB  250 mg IntraVENous Q24H    doxazosin (CARDURA) tablet 2 mg  2 mg Oral DAILY    balsam peru-castor oiL (VENELEX) ointment   Topical BID    thiamine HCL (B-1) tablet 100 mg  100 mg Oral DAILY    folic acid (FOLVITE) tablet 1 mg  1 mg Oral DAILY    LORazepam (ATIVAN) injection 2 mg  2 mg IntraVENous Q1H PRN    LORazepam (ATIVAN) injection 4 mg  4 mg IntraVENous Q1H PRN ______________________________________________________________________  EXPECTED LENGTH OF STAY: - - -  ACTUAL LENGTH OF STAY:          2                 Sebastian Jameson MD

## 2022-01-11 NOTE — PROGRESS NOTES
Problem: Mobility Impaired (Adult and Pediatric)  Goal: *Acute Goals and Plan of Care (Insert Text)  Description: FUNCTIONAL STATUS PRIOR TO ADMISSION: Patient was modified independent using a rolling walker for functional mobility. HOME SUPPORT PRIOR TO ADMISSION: The patient lived alone with local children who check in on the patient. He reports sharing  his mobile home with a renter who is often home. Physical Therapy Goals  Initiated 1/11/2022  1. Patient will move from supine to sit and sit to supine  in bed with supervision/set-up within 7 day(s). 2.  Patient will transfer from bed to chair and chair to bed with modified independence using the least restrictive device within 7 day(s). 3.  Patient will perform sit to stand with modified independence within 7 day(s). 4.  Patient will ambulate with modified independence for 100 feet with the least restrictive device within 7 day(s). Outcome: Progressing Towards Goal   PHYSICAL THERAPY EVALUATION  Patient: Kierra Peña (75 y.o. male)  Date: 1/11/2022  Primary Diagnosis: Severe sepsis ([facility] [A41.9, R65.20]        Precautions: seizure, ETOH withdrawal       ASSESSMENT  Based on the objective data described below, the patient presents with generally decreased strength, balance, and activity tolerance with tachycardia and orthostatic hypotension during evaluation following admission for sepsis and ETOH abuse. The patient was received in bed saturated in IV fluid. He preferred to complete mobility on his own but completed overall with minimal assistance and significantly increased time. He c/o dizziness with each position change and noted a significant drop in BP. He was able to stand briefly at bedside with minimal assistance using a RW but required return to sitting EOB d/t dizziness before a full standing reading was acquired. Nursing arrived d/t observed vitals and patient was returned to supine.  Discharge recommendations TBD based on progression going forward. Concern for discharge home at present but related to medical co morbidities > observed physical debility. Current Level of Function Impacting Discharge (mobility/balance): minimal assist overall for transfers       Patient will benefit from skilled therapy intervention to address the above noted impairments. PLAN :  Recommendations and Planned Interventions: bed mobility training, transfer training, gait training, therapeutic exercises, and neuromuscular re-education      Frequency/Duration: Patient will be followed by physical therapy:  5 times a week to address goals. Recommendation for discharge: (in order for the patient to meet his/her long term goals)  To be determined: home with additional supervision and HHPT? IF patient discharges home will need the following DME: none         SUBJECTIVE:   Patient stated I'm dizzy.     OBJECTIVE DATA SUMMARY:   HISTORY:    Past Medical History:   Diagnosis Date    ETOH abuse     Hypertension     UTI (urinary tract infection)      Past Surgical History:   Procedure Laterality Date    HX ORTHOPAEDIC      LTHR    HX ORTHOPAEDIC      x3 fused neck vertebre    HX OTHER SURGICAL      R carotid and L thigh vessel cleaning       Personal factors and/or comorbidities impacting plan of care:     Home Situation  Home Environment: (P) Trailer/mobile home  # Steps to Enter: (P) 0  Wheelchair Ramp: (P) Yes  Living Alone: (P) No (renter lives with)  Support Systems: (P) Child(catie)  Current DME Used/Available at Home: (P) Walker, rolling  Tub or Shower Type: (P) Tub/Shower combination    EXAMINATION/PRESENTATION/DECISION MAKING:   Critical Behavior:  Neurologic State: Alert  Orientation Level: Oriented X4        Hearing:   Auditory  Auditory Impairment: None  Skin:    Edema:   Range Of Motion:  AROM: Generally decreased, functional                       Strength:    Strength: Generally decreased, functional                    Tone & Sensation: Tone: Normal              Sensation: Intact               Coordination:  Coordination: Within functional limits  Vision:      Functional Mobility:  Bed Mobility:     Supine to Sit: Minimum assistance;Contact guard assistance; Additional time  Sit to Supine: Minimum assistance;Assist x2  Scooting: Moderate assistance (to complete to EOB)  Transfers:  Sit to Stand: Minimum assistance  Stand to Sit: Contact guard assistance                       Balance:   Sitting: Intact  Standing: Impaired  Standing - Static: Fair  Standing - Dynamic : Fair     Physical Therapy Evaluation Charge Determination   History Examination Presentation Decision-Making   MEDIUM  Complexity : 1-2 comorbidities / personal factors will impact the outcome/ POC  LOW Complexity : 1-2 Standardized tests and measures addressing body structure, function, activity limitation and / or participation in recreation  LOW Complexity : Stable, uncomplicated  LOW Complexity : FOTO score of       Based on the above components, the patient evaluation is determined to be of the following complexity level: LOW         Activity Tolerance:   Fair and signs and symptoms of orthostatic hypotension  Vitals:    01/11/22 1154 01/11/22 1155 01/11/22 1200 01/11/22 1205   BP: (!) 58/41  (!) 144/85    BP 1 Location:       BP Patient Position: Sitting  Comment: unable to remain standing  Supine    Pulse: (!) 127 (!) 124 (!) 112 (!) 112   Temp:       Resp:       Height:       Weight:       SpO2:   97%        After treatment patient left in no apparent distress:   Supine in bed and Call bell within reach    COMMUNICATION/EDUCATION:   The patients plan of care was discussed with: Registered nurse. Fall prevention education was provided and the patient/caregiver indicated understanding., Patient/family have participated as able in goal setting and plan of care. , and Patient/family agree to work toward stated goals and plan of care.     Thank you for this referral.  Asia Lema, PT, DPT   Time Calculation: 30 mins

## 2022-01-12 PROBLEM — G93.41 METABOLIC ENCEPHALOPATHY: Status: ACTIVE | Noted: 2022-01-01

## 2022-01-12 NOTE — PROGRESS NOTES
1930: Bedside and Verbal shift change report given to JEANETTE Davis (oncoming nurse) by Geoff Alegria RN (offgoing nurse).  Report included the following information SBAR, Kardex, Intake/Output, MAR, Recent Results and Cardiac Rhythm SR.

## 2022-01-12 NOTE — PROGRESS NOTES
Physical Therapy  Chart reviewed for updates and consulted with nursing. RN reports patient with a significant drop in HGB and plans to redraw labs. Per RN report, patient not medically appropriate for PT and suggesting therapy hold today. Will continue to follow.    Alvaro Barnes, PT, DPT

## 2022-01-12 NOTE — PROGRESS NOTES
[facility]  Hospitalist Group                                                                                          Hospitalist Progress Note  Adrian Fajardo MD  Answering service: [phone] OR 1912 from in house phone        Date of Service:  2022  NAME:  Dragan Keys  :  1950  MRN:  [id number]      Admission Summary:   Copied form admit: \" Admitted loopy in setting of incompletely treated UTI, now for cont rx in hospital setting. Lactic acid normalizes, ivf/bolus/ivf cont and Levaquin dosed by pharmacy on gong, cutl urine/blood neg ;pending to date  Wbc nl and pt afeb, alert appropriate non focal and asymptomatic. Has chronic obstructive uropathy  ( intol to FLomax)   Adrian Fajardo MD 1/10/2022  \"    Interval history / Subjective:     2022 :    Appears in fulminant w/d   Low mag/k  Sebastian Calles Morning notes \" not w/d but something else\" \" he never has withdrawal\" notes daily muliple mixed drinks daily.  Nerolog to weigh in as familly desires same   Family interferred with Librium dosing designed to help prevent the w/d finding noted today.  Low Hgb, - for varification   Suspect low Hgb real, - stools for occult blood, PPI iv , Gi to see, stool test -  And blood transfusion once Hgb verified low  - on recheck not changed, can cancel GI eval    Additional : iron profile, ammonia levels    Consistently low BS's change iv to dextrose/ns /k and monitor bs closely   Lidia Joshi:         Assessment & Plan:       UTI  - ivf/cult/abx    Withdrawal- fulminant   - timing for findings is typical;   - neurology to weight in ;   - else thiamine,   - CIWA ( family declined routine Librium )     Alcohol excess use  - CIWA  - Repelace electrolytes  - Librium  - Thiamine    Hypoglycemia:  - Consistently low BS's change iv to dextrose/ns /k and monitor bs closely     Metabolic acidosis.    - 2n2 vol contraction, and alcohol induced ( alcohol is listed as a general primary cause in UpToDate and fits clinical picture. )    dys-electroltyemia   - replace K, Mag  - f/u lab        Hospital Problems  Never Reviewed          Codes Class Noted POA    Severe sepsis [facility]) ICD-10-CM: A41.9, R65.20  ICD-9-CM: 038.9, 995.92  1/9/2022 Unknown                  After personally interviewing pt at bedside the following is noted on 1/12/2022 :    Review of Systems   Reason unable to perform ROS: confused               I had a face to face encounter with patient on 1/12/2022 at bedside for the following physical exam:     PHYSICAL EXAM:    Visit Vitals  /77 (BP 1 Location: Right upper arm, BP Patient Position: At rest)   Pulse 100   Temp 97.1 °F (36.2 °C)   Resp 19   Ht 5' 8.5\" (1.74 m)   Wt 44.1 kg (97 lb 3.6 oz)   SpO2 100%   BMI 14.57 kg/m²          Physical Exam  Constitutional:       General: He is not in acute distress. Appearance: Normal appearance. He is ill-appearing. He is not toxic-appearing or diaphoretic. HENT:      Head: Normocephalic and atraumatic. Right Ear: External ear normal.      Left Ear: External ear normal.      Nose: Nose normal.      Mouth/Throat:      Mouth: Mucous membranes are moist.      Pharynx: Oropharynx is clear. Eyes:      Extraocular Movements: Extraocular movements intact. Pupils: Pupils are equal, round, and reactive to light. Cardiovascular:      Rate and Rhythm: Normal rate and regular rhythm. Pulmonary:      Effort: Pulmonary effort is normal.      Breath sounds: Normal breath sounds. Abdominal:      General: Abdomen is flat. Bowel sounds are normal.      Palpations: Abdomen is soft. Musculoskeletal:         General: Tenderness present. No swelling, deformity or signs of injury. Cervical back: Normal range of motion and neck supple. Skin:     General: Skin is warm and dry. Neurological:      Mental Status: He is alert. Mental status is at baseline.       Comments: Confused    Psychiatric:      Comments: Delrious, not agitated, not overtly anxious                      Data Review:    Review and/or order of clinical lab test      Labs:     Recent Labs     01/12/22 0448 01/11/22 0508   WBC 2.6* 3.5*   HGB 6.0* 8.2*   HCT 19.6* 26.1*   PLT 72* 102*     Recent Labs     01/12/22 0448 01/11/22 0508 01/09/22 1921    139 127*   K 3.6 2.9* 3.1*   * 111* 89*   CO2 13* 11* 19*   BUN 12 17 26*   CREA 0.63* 0.88 1.80*   GLU 63* 61* 111*   CA 7.0* 7.7* 9.5   MG 1.1*  --  1.2*     Recent Labs     01/12/22 0448 01/09/22 1921   ALT PENDING 15   AP 46 76   TBILI 0.4 1.2*   TP 4.4* 7.6   ALB 1.8* 2.9*   GLOB 2.6 4.7*     No results for input(s): INR, PTP, APTT, INREXT, INREXT in the last 72 hours. No results for input(s): FE, TIBC, PSAT, FERR in the last 72 hours. No results found for: FOL, RBCF   No results for input(s): PH, PCO2, PO2 in the last 72 hours. No results for input(s): CPK, CKNDX, TROIQ in the last 72 hours.     No lab exists for component: CPKMB  No results found for: CHOL, CHOLX, CHLST, CHOLV, HDL, HDLP, LDL, LDLC, DLDLP, TGLX, TRIGL, TRIGP, CHHD, CHHDX  No results found for: GLUCPOC  Lab Results   Component Value Date/Time    Color PINK 01/10/2022 03:30 AM    Appearance CLOUDY (A) 01/10/2022 03:30 AM    Specific gravity 1.025 01/10/2022 03:30 AM    pH (UA) 6.0 01/10/2022 03:30 AM    Protein TRACE (A) 01/10/2022 03:30 AM    Glucose Negative 01/10/2022 03:30 AM    Ketone TRACE (A) 01/10/2022 03:30 AM    Urobilinogen 1.0 01/10/2022 03:30 AM    Nitrites Negative 01/10/2022 03:30 AM    Leukocyte Esterase TRACE (A) 01/10/2022 03:30 AM    Epithelial cells FEW 01/10/2022 03:30 AM    Bacteria Negative 01/10/2022 03:30 AM    WBC 0-4 01/10/2022 03:30 AM    RBC  01/10/2022 03:30 AM         Medications Reviewed:     Current Facility-Administered Medications   Medication Dose Route Frequency    arformoteroL (BROVANA) neb solution 15 mcg  15 mcg Nebulization BID RT    And    budesonide (PULMICORT) 500 mcg/2 ml nebulizer suspension 500 mcg Nebulization BID RT    albuterol-ipratropium (DUO-NEB) 2.5 MG-0.5 MG/3 ML  3 mL Nebulization Q4H PRN    pantoprazole (PROTONIX) injection 40 mg  40 mg IntraVENous Q12H    And    sodium chloride (NS) flush 10 mL  10 mL IntraVENous Q12H    cholecalciferol (VITAMIN D3) (400 Units /10 mcg) tablet 400 Units  400 Units Oral DAILY    sodium bicarbonate tablet 650 mg  650 mg Oral TID    potassium chloride SR (KLOR-CON 10) tablet 20 mEq  20 mEq Oral BID    chlordiazePOXIDE (LIBRIUM) capsule 25 mg  25 mg Oral TID    enoxaparin (LOVENOX) injection 40 mg  40 mg SubCUTAneous Q24H    influenza vaccine 2021-22 (6 mos+)(PF) (FLUARIX/FLULAVAL/FLUZONE QUAD) injection 0.5 mL  1 Each IntraMUSCular PRIOR TO DISCHARGE    0.9% sodium chloride infusion  50 mL/hr IntraVENous CONTINUOUS    levoFLOXacin (LEVAQUIN) 250 mg in D5W IVPB  250 mg IntraVENous Q24H    doxazosin (CARDURA) tablet 2 mg  2 mg Oral DAILY    balsam peru-castor oiL (VENELEX) ointment   Topical BID    thiamine HCL (B-1) tablet 100 mg  100 mg Oral DAILY    folic acid (FOLVITE) tablet 1 mg  1 mg Oral DAILY    LORazepam (ATIVAN) injection 2 mg  2 mg IntraVENous Q1H PRN    LORazepam (ATIVAN) injection 4 mg  4 mg IntraVENous Q1H PRN     ______________________________________________________________________  EXPECTED LENGTH OF STAY: 3d 12h  ACTUAL LENGTH OF STAY:          3                 Sebastián Nuñez MD

## 2022-01-12 NOTE — PROGRESS NOTES
1130: Bedside and Verbal shift change report given to [address] [place] [address] (oncoming nurse) by Marcelino Vargas (offgoing nurse). Report included the following information SBAR, Kardex, Intake/Output, MAR, Accordion, Recent Results and Cardiac Rhythm nsr. 1930: Bedside and Verbal shift change report given to JEANETTE Muñoz (oncoming nurse) by [address] [place] [address] (offgoing nurse). Report included the following information SBAR, Kardex, Intake/Output, MAR, Accordion, Recent Results and Cardiac Rhythm nsr.

## 2022-01-12 NOTE — PROGRESS NOTES
NANCY: Anticipate discharge home w/HH v SNF pending medical progress. Transportation likely in car with family v BLS. RUR: 14%    Disposition: Patient admitted 1/9 for confusion. Patient resides in a one level home with ramp. He has a roommate, but does not receive any assistance from roommate. DME: walker, rollator, cane, bedside commode and tub bench. CM spoke with patient's son, Kamilla Person, to inquire as to choice for SNF. Son stated he is a  for [facility] and would prefer his father not go to a SNF. He stated he is sure his father will not agree. Patient has used a [place] agency in the past and would be open to using them again. Son stated they were out of [place]. Son also requested the number for patient advocacy as he is unhappy with initial medical treatment received by his father. CM met with patient's son at bedside and provided the information. Son stated he will find out the name of the [facility] and call with the information. Transition of Care Plan:     The Plan for Transition of Care is related to the following treatment goals: SNF v [place]    The Patient and/or patient representative, Daryle Lexii, was provided with a choice of provider and agrees  with the discharge plan. Yes [x] No []    A Freedom of choice list was provided with basic dialogue that supports the patient's individualized plan of care/goals and shares the quality data associated with the providers.        Yes [x] No []    Scott Beasley, [facility],[address]  [phone]

## 2022-01-12 NOTE — ACP (ADVANCE CARE PLANNING)
Advance Care Planning   Advance Care Planning Inpatient Note  [address] [place] Department    Today's Date: 1/12/2022  Unit: [facility] 4 CV SERVICES UNIT    Received request from IDT member. Upon review of chart and communication with care team, patient's decision making abilities are not in question. Patient was/were present in the room during visit. Goals of ACP Conversation:  Discuss Advance Care planning documents    Health Care Decision Makers:      Secondary Decision Maker: Arlette Fish - [phone]      Summary:  Completed [address]  Documented Next of Kin, per patient report    Advance Care Planning Documents (Patient Wishes) on file:  Healthcare Power of /Advance Directive appointment of Health care agent     Assessment:    Provided support to this pt in [facility] 457. Reviewed pt's chart prior to this visit. Offered assistance to pt as a result of AMD consult. Explained purpose of AMD and when it becomes active to pt. Explained Hiearchy of state guidelines concerning decision making with pt. Assisted pt in completing AMD, answering questions for clarity as pt progressed through the completion of AMD.  Pt didn't want to complete the entire document, he only wanted to name one of his sons, Keli Phan as his MPOA and another son, Javon Kidd, as his secondary decision maker. Returned original and made copies for pt and added a copy to pt's physical chart to be scanned. [address] engaged pt in life review and offered listening presence.          Interventions:  Provided education on documents for clarity and greater understanding  Discussed and provided education on state decision maker hierarchy  Assisted in the completion of documents according to patient's wishes at this time  Encouraged ongoing ACP conversation with future decision makers and loved ones    Care Preferences Communicated:  No    Outcomes/Plan:  ACP Discussion Completed  New Advance Directive completed  Returned original document(s) to patient, as well as copies for distribution to appointed agents  Copy of Advance Directive given to staff to scan into medical record    Slipager 41 on 1/12/2022 at 2:12 PM

## 2022-01-12 NOTE — PROGRESS NOTES
Problem: Pressure Injury - Risk of  Goal: *Prevention of pressure injury  Description: Document Virgil Scale and appropriate interventions in the flowsheet. 1/11/2022 2004 by Esperanza Schmitz RN  Outcome: Progressing Towards Goal  Note: Pressure Injury Interventions:       Moisture Interventions: Absorbent underpads,Apply protective barrier, creams and emollients,Assess need for specialty bed,Check for incontinence Q2 hours and as needed,Limit adult briefs,Maintain skin hydration (lotion/cream),Minimize layers,Moisture barrier,Offer toileting Q_hr    Activity Interventions: Assess need for specialty bed,Chair cushion,Increase time out of bed,Pressure redistribution bed/mattress(bed type),PT/OT evaluation    Mobility Interventions: Assess need for specialty bed,Chair cushion,Float heels,HOB 30 degrees or less,Pressure redistribution bed/mattress (bed type),PT/OT evaluation,Turn and reposition approx.  every two hours(pillow and wedges)    Nutrition Interventions: Document food/fluid/supplement intake,Discuss nutritional consult with provider,Offer support with meals,snacks and hydration    Friction and Shear Interventions: Apply protective barrier, creams and emollients,Foam dressings/transparent film/skin sealants,HOB 30 degrees or less,Lift team/patient mobility team,Minimize layers             1/11/2022 0945 by Esperanza Schmitz RN  Outcome: Progressing Towards Goal  Note: Pressure Injury Interventions:       Moisture Interventions: Absorbent underpads,Check for incontinence Q2 hours and as needed,Minimize layers    Activity Interventions: Assess need for specialty bed    Mobility Interventions: Assess need for specialty bed,HOB 30 degrees or less,Pressure redistribution bed/mattress (bed type)    Nutrition Interventions: Document food/fluid/supplement intake,Discuss nutritional consult with provider,Offer support with meals,snacks and hydration    Friction and Shear Interventions: Apply protective barrier, creams and emollients,HOB 30 degrees or less,Lift sheet,Minimize layers                Problem: Falls - Risk of  Goal: *Absence of Falls  Description: Document Skip Fall Risk and appropriate interventions in the flowsheet.   1/11/2022 2004 by Amee Gotti RN  Outcome: Progressing Towards Goal  Note: Fall Risk Interventions:  Mobility Interventions: Assess mobility with egress test,Bed/chair exit alarm,Communicate number of staff needed for ambulation/transfer,OT consult for ADLs,Patient to call before getting OOB,PT Consult for mobility concerns,PT Consult for assist device competence,Strengthening exercises (ROM-active/passive),Utilize walker, cane, or other assistive device,Utilize gait belt for transfers/ambulation    Mentation Interventions: Adequate sleep, hydration, pain control,Bed/chair exit alarm,Door open when patient unattended,Evaluate medications/consider consulting pharmacy,Familiar objects from home,Gait belt with transfers/ambulation,Increase mobility,More frequent rounding,Reorient patient,Room close to nurse's station,Self-releasing belt,Toileting rounds,Update white board    Medication Interventions: Bed/chair exit alarm,Evaluate medications/consider consulting pharmacy,Patient to call before getting OOB,Teach patient to arise slowly,Utilize gait belt for transfers/ambulation         History of Falls Interventions: Bed/chair exit alarm,Consult care management for discharge planning,Door open when patient unattended,Evaluate medications/consider consulting pharmacy,Investigate reason for fall,Room close to nurse's station      1/11/2022 0945 by Amee Gotti, RN  Outcome: Progressing Towards Goal  Note: Fall Risk Interventions:  Mobility Interventions: Bed/chair exit alarm,Communicate number of staff needed for ambulation/transfer,Patient to call before getting OOB,Strengthening exercises (ROM-active/passive),Utilize walker, cane, or other assistive device    Mentation Interventions: Adequate sleep, hydration, pain control,Bed/chair exit alarm,Door open when patient unattended,Evaluate medications/consider consulting pharmacy,Eyeglasses and hearing aids,Gait belt with transfers/ambulation,Increase mobility,More frequent rounding,Reorient patient,Room close to nurse's station,Self-releasing belt,Toileting rounds,Update white board    Medication Interventions: Bed/chair exit alarm,Evaluate medications/consider consulting pharmacy,Patient to call before getting OOB,Teach patient to arise slowly         History of Falls Interventions: Bed/chair exit alarm         Problem: Patient Education: Go to Patient Education Activity  Goal: Patient/Family Education  Outcome: Progressing Towards Goal     Problem: Patient Education: Go to Patient Education Activity  Goal: Patient/Family Education  Outcome: Progressing Towards Goal

## 2022-01-12 NOTE — PROGRESS NOTES
Spiritual Care Assessment/Progress Note  [facility]      NAME: Sophie Mcneill      MRN: [id number]  AGE: 80 y.o.  SEX: male  [employer] Affiliation:    Language: English     1/12/2022     Total Time (in minutes): 46     Spiritual Assessment begun in [facility] [place] [facility] through conversation with:         [x]Patient        [] Family    [] Friend(s)        Reason for Consult: Advance medical directive consult     Spiritual beliefs: (Please include comment if needed)     [] Identifies with a vandana tradition:         [] Supported by a vandana community:            [] Claims no spiritual orientation:           [] Seeking spiritual identity:                [] Adheres to an individual form of spirituality:           [x] Not assessed:                           Identified resources for coping:      [] Prayer                               [] Music                  [] Guided Imagery     [x] Family/friends                 [] Pet visits     [] Devotional reading                         [] Unknown     [] Other:                                               Interventions offered during this visit: (See comments for more details)    Patient Interventions: Advance medical directive consult,Advance medical directive completed,Affirmation of emotions/emotional suffering,Initial/Spiritual assessment, patient floor           Plan of Care:     [] Support spiritual and/or cultural needs    [] Support AMD and/or advance care planning process      [] Support grieving process   [] Coordinate Rites and/or Rituals    [] Coordination with community clergy   [] No spiritual needs identified at this time   [] Detailed Plan of Care below (See Comments)  [] Make referral to Music Therapy  [] Make referral to Pet Therapy     [] Make referral to Addiction services  [] Make referral to [facility]  [] Make referral to Spiritual Care Partner  [] No future visits requested        [x] Contact Spiritual Care for further referrals     Comments: Provided support to this pt in [address] [place] [address]. Reviewed pt's chart prior to this visit. Offered assistance to pt as a result of AMD consult. Explained purpose of AMD and when it becomes active to pt. Explained [place] of state guidelines concerning decision making with pt. Assisted pt in completing AMD, answering questions for clarity as pt progressed through the completion of AMD.  Pt didn't want to complete the entire document, he only wanted to name one of his sons, Daryl Coronado as his MPOA and another son, Mainor Acosta, as his secondary decision maker. Returned original and made copies for pt and added a copy to pt's physical chart to be scanned. [facility] engaged pt in life review and offered listening presence. Jerzy Caruso MDiv.  Staff   Request  Support/Spiritual Care Services on [phone])

## 2022-01-12 NOTE — CONSULTS
NEUROLOGY CONSULT NOTE    Name Alber Mejia Age 80 y.o. MRN [id number]  1950     Consulting Physician: Lisa Carson MD      Chief Complaint:  AMS     Assessment/Plan:     Active Problems:    Severe sepsis [facility]) ([phone])      Metabolic encephalopathy ()      80year old male with a h/o ETOH abuse, HTN, recent UTI admitted with altered mental status likely related to metabolic encephalopathy in the setting of hyponatremia, FAUSTINO and episodic hypoglycemia this admission. Neurological examination is presently non-focal, A&O x 4. Low suspicion for acute intracranial pathology or subclinical seizure activity. Discussed with his son that we can see occasional fluctuation in mental status with metabolic derangements-will defer correction to primary team. He appears back to his baseline presently. Please call if further questions/concerns. Thank you very much for this referral. I appreciate the opportunity to participate in this patient's care. History of Present Illness: This is a 80 y.o.   male, we were asked to see for AMS. PMH notable for HTN, ETOH abuse, recent UTI. Per son at bedside, patient lives independently. He was diagnosed with UTI approximately 2 weeks ago but did not complete course of antibiotic therapy. He appeared more confused PTA, lethargic and was found covered in stool at his home. On arrival to [facility] [place], labs were notable for elevated lactate, FAUSTINO, hyponatremia which appear corrected presently. More recent labs reveal episodic hypoglycemia. His son at bedside requested an opinion regarding patients mental status, as he has noted some fluctuation with worsening confusion/delirium yesterday. He was not recognizing family members. His son reports he typically drinks 3 glasses of alcohol daily but has never experienced ETOH-related withdrawal/DTs. There is no reported focal weakness, numbness/tingling, speech or vision deficits.      Allergies   Allergen Reactions    Keflex [Cephalexin] Anaphylaxis        Prior to Admission medications    Medication Sig Start Date End Date Taking? Authorizing Provider   clopidogreL (PLAVIX) 75 mg tab Take 75 mg by mouth daily. 3/3/21  Yes Other, MD Joseph   ferrous sulfate 325 mg (65 mg iron) tablet Take 1 Tablet by mouth two (2) times a day. 1/15/21  Yes Other, MD Joseph   atenoloL (TENORMIN) 50 mg tablet Take 50 mg by mouth daily. 10/11/21   Other, MD Joseph       Past Medical History:   Diagnosis Date    ETOH abuse     Hypertension     UTI (urinary tract infection)         Past Surgical History:   Procedure Laterality Date    HX ORTHOPAEDIC      LTHR    HX ORTHOPAEDIC      x3 fused neck vertebre    HX OTHER SURGICAL      R carotid and L thigh vessel cleaning        Social History     Tobacco Use    Smoking status: Current Every Day Smoker    Smokeless tobacco: Not on file   Substance Use Topics    Alcohol use: Yes        History reviewed. No pertinent family history. Review of Systems:   Comprehensive review of systems performed and negative except for as listed above. Exam:     Visit Vitals  /77 (BP 1 Location: Right upper arm, BP Patient Position: At rest)   Pulse 100   Temp 97.1 °F (36.2 °C)   Resp 19   Ht 5' 8.5\" (1.74 m)   Wt 97 lb 3.6 oz (44.1 kg)   SpO2 100%   BMI 14.57 kg/m²        General: Frail appearing male, Patient in no apparent distress   Head: Normocephalic, atraumatic, anicteric sclera   Lungs:  Clear to auscultation bilaterally, No wheezes or rubs   Cardiac: Regular rate and rhythm with no murmurs. Abd: Bowel sounds were audible. No tenderness on palpation   Ext: No pedal edema   Skin: No overt signs of rash     Neurological Exam:  Mental Status: Alert and oriented to person place, date, situation. Speech: Fluent no aphasia or dysarthria. Cranial Nerves:   Intact visual fields. Facial sensation is normal. Facial movement is symmetric. Palate is midline.   Normal sternocleidomastoid strength. Tongue is midline. Hearing is intact bilaterally. Eyes: PERRL, EOM's full, no nystagmus, no ptosis. Motor:  UE 5-/5 b/l, LE 4-/5 b/l (baseline per pt/family)   Reflexes:   Deep tendon reflexes 1+/4 and symmetrical.  Plantar response is downgoing b/l. Sensory:   Symmetrically intact  with no perceived deficits modalities involving small or large fibers. Gait:  Gait is deferred   Tremor:   No tremor noted. Cerebellar:  Finger to nose and heel over shin to knee was demonstrated competently. Neurovascular: No carotid bruits. Imaging  CT Results (maximum last 3): No results found for this or any previous visit. MRI Results (maximum last 3): No results found for this or any previous visit. Lab Review  Lab Results   Component Value Date/Time    WBC 2.6 (L) 01/12/2022 04:48 AM    HCT 19.6 (L) 01/12/2022 04:48 AM    HGB 6.0 (L) 01/12/2022 04:48 AM    PLATELET 72 (L) 46/70/6926 04:48 AM     Lab Results   Component Value Date/Time    Sodium 141 01/12/2022 04:48 AM    Potassium 3.6 01/12/2022 04:48 AM    Chloride 115 (H) 01/12/2022 04:48 AM    CO2 13 (LL) 01/12/2022 04:48 AM    Glucose 63 (L) 01/12/2022 04:48 AM    BUN 12 01/12/2022 04:48 AM    Creatinine 0.63 (L) 01/12/2022 04:48 AM    Calcium 7.0 (L) 01/12/2022 04:48 AM     No components found for: TROPQUANT  No results found for: CHIP    Signed:  Dre Saul.  Aan Gee DO  1/12/2022  12:04 PM

## 2022-01-12 NOTE — PROGRESS NOTES
Occupational Therapy  01/12/22     Patient currently on OT caseload. OT tx attempted at 2:19 PM however chart reviewed and nursing requesting therapy to hold today, patient not medically appropriate to participate. Will continue to follow patient and attempt OT at a later time.      Thank you,  Fatmata Veliz OTR/L

## 2022-01-12 NOTE — ACP (ADVANCE CARE PLANNING)
Son  Nicole Burciaga is the [facility]; pt to be full code ; case discussed with same son 1/12/2022      Prashanth May MD

## 2022-01-12 NOTE — CONSULTS
NUTRITION brief  Recommendations:     Recommend daily goody bag for now since pt refusing PO thiamine/MVI/folic acid. Noted thiamine converted to IV, but pt still needs folic acid and MVI    If no plans to order goody bag, recommend IV folic acid and MVI to ensure pt receives since PO intake is unreliable at this time    RD added on Phos given severity of malnutrition and refeeding risk; replete PRN    RD adjusted ONS per pt request, but low suspicion for PO improvements at this time    Consider DHT placement for supplemental nutrition and meds if pt continues to refuse PO intake- however, ?if pt would allow/ keep in place       Diet: easy to chew  Supplements/Nutrition Support: Ensure Clear BID, Ensure Enlive once daily, Magic Cup BID  Nutrition-related meds: reviewed    New events impacting nutrition plan of care: consult received for poor PO intake. Pt already being followed by this service. Spoke with RN, reports pt awake/alert, but not eating and refusing to take medications as well. Not currently displaying s/s of withdrawal.  On thiamine/ folic acid, but noted cup of pills at bedside untouched - all meds from this AM (including thiamine, folic acid, and Klor) listed as administered, but pt did not actually take. Noted thiamine being transitioned to IV route and D5+KCl to start. Goody bag seems like a reasonable choice as recommended by RD 2 days ago. Met with pt at bedside. No family present. Up in bed, lunch tray in front of him. Pt pleasant and cooperative. Reports he hasn't been eating, but he's trying to now. Minimal (if any) consumed from tray. He states texture is fine. Did not like the Ensure Clear \"tastes like sugar water\" but stated he would prefer the Ensure Enlive in chocolate. Ideally, pt would benefit from [address] for supplemental enteral nutrition given severity of malnutrition, however doubt pt would allow placement, or keep in place.   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 Folate low normal, B12 >NL. Thiamine levels not checked - if desired, would do so before starting IV thiamine. Pt with low Mg, but has received repletion. Suggest checking Phos as well. Once D5 started, levels may worsen. Will add on Phos for baseline. See full RD assessment from 1/10 for additional details, goals, and monitoring/evaluation.    Estimated Nutrition Needs:   Energy: ~ 7737-3864 kcals (MSJ x 1.3-1.4 for wt gain)  Wt used: Current  Protein: 1.5-1.7 gm pro/kg actual wt  Wt used: Current   Fluid: 1 ml/kcal       Recent Labs     01/12/22  0448 01/11/22  0508 01/09/22  1921   GLU 63* 61* 111*   BUN 12 17 26*   CREA 0.63* 0.88 1.80*    139 127*   K 3.6 2.9* 3.1*   * 111* 89*   CO2 13* 11* 19*   CA 7.0* 7.7* 9.5   MG 1.1*  --  1.2*       Folate   Date Value Ref Range Status   01/12/2022 6.7 5.0 - 21.0 ng/mL Final     Vitamin B12   Date Value Ref Range Status   01/12/2022 1,030 (H) 193 - 986 pg/mL Final       Maria Luisa Antis, RD  Available via [employer]

## 2022-01-13 NOTE — PROGRESS NOTES
Occupational Therapy  01/13/22     Patient currently on OT caseload. OT tx attempted at 2:09 PM however per nursing, patient found to have fluid around lungs and is SOB today. Nursing request therapy to hold for today. Will continue to follow patient and attempt OT at a later time.      Thank you,  Florencio Brown, OTR/L

## 2022-01-13 NOTE — ED NOTES
Code called in [address].   per nursing staff patient per nursing staff patient had a respiratory arrest after slowing heart rate after Noncon CT chest.  Code initiated in CT room. Patient unresponsive. Not breathing. No pulse. Chest compressions started. Intubation    Date/Time: 1/13/2022 6:33 PM  Performed by: Charlotte Culp MD  Authorized by: Charlotte Culp MD     Consent:     Consent obtained:  Emergent situation  Pre-procedure details:     Patient status:  Unresponsive  Procedure details:     Preoxygenation:  Bag valve mask    CPR in progress: yes      Intubation method:  Oral    Oral intubation technique:  Fiber optic    Laryngoscope blade: Mac 3    Tube size (mm):  7.5    Tube type:  Cuffed    Number of attempts:  1    Ventilation between attempts: no      Cricoid pressure: no      Tube visualized through cords: yes    Placement assessment:     ETT to lip:  20    Tube secured with:  ETT wall    Placement verification: chest rise, equal breath sounds and ETCO2 detector    Post-procedure details:     Patient tolerance of procedure: Tolerated well, no immediate complications    1 dose Epinephrine pulse returned with regular rhythm. Handed over running code to hospitalist just after pulse returned.

## 2022-01-13 NOTE — CONSULTS
[facility]    ICU TEAM Consult    Name: Diantha Paget   : 1950   MRN: [id number]   Date: 2022      Reason for ICU Consult: Cardiac arrest    HPI[de-identified]   This is 81-year-old gentleman with chronic history of alcohol abuse who was admitted through the ER on January 10 with severe acidosis thought to be related to undertreated UTI. Patient reportedly had some confusion at this time however his exam was nonfocal and seems to be improving per documentation. On  primary hospitalist consulted ICU as he is concerned that his mental status is worsening and that his oxygen level is decreasing though remained adequate especially in setting of right lower lobe infiltrate on x-ray. Before ICU evaluated the patient he went immediately down to get the CT chest and then CAT scan he sustained cardiac arrest and ROSC was achieved after about 7 minutes [please see code note for details]  Now patient is poorly responsive requiring high ventilatory setting in CCU    Active Problem List:     Problem List  Never Reviewed          Codes Class    Metabolic encephalopathy NKB-[id number]: G93.41  ICD-9-CM: 348.31         Severe sepsis ([facility] [address] ICD-10-CM: A41.9, R65.20  ICD-9-CM: 038.9, 995.92               Past Medical History:      has a past medical history of ETOH abuse, Hypertension, and UTI (urinary tract infection). Past Surgical History:      has a past surgical history that includes hx orthopaedic; hx orthopaedic; and hx other surgical.    Home Medications:     Prior to Admission medications    Medication Sig Start Date End Date Taking? Authorizing Provider   clopidogreL (PLAVIX) 75 mg tab Take 75 mg by mouth daily. 3/3/21  Yes Other, MD Joseph   ferrous sulfate 325 mg (65 mg iron) tablet Take 1 Tablet by mouth two (2) times a day. 1/15/21  Yes Other, MD Joseph   atenoloL (TENORMIN) 50 mg tablet Take 50 mg by mouth daily.  10/11/21   Other, MD Joseph       Allergies/Social/Family History: Allergies   Allergen Reactions    Keflex [Cephalexin] Anaphylaxis      Social History     Tobacco Use    Smoking status: Current Every Day Smoker    Smokeless tobacco: Not on file   Substance Use Topics    Alcohol use: Yes      History reviewed. No pertinent family history. Review of Systems:     Difficult to obtain as the patient is confused    Objective:   Vital Signs:  Visit Vitals  BP (!) 100/51 (BP 1 Location: Right arm, BP Patient Position: At rest)   Pulse 99   Temp (!) 96.2 °F (35.7 °C)   Resp 30   Ht 5' 8.5\" (1.74 m)   Wt 45.9 kg (101 lb 3.1 oz)   SpO2 92%   BMI 15.16 kg/m²      O2 Device: None (Room air) Temp (24hrs), Av.5 °F (36.4 °C), Min:96.2 °F (35.7 °C), Max:98.1 °F (36.7 °C)           Intake/Output:     Intake/Output Summary (Last 24 hours) at 2022 1817  Last data filed at 2022 0800  Gross per 24 hour   Intake 720 ml   Output 100 ml   Net 620 ml       Physical Exam:    General:   Intubated on mechanical ventilation   Eyes:  Sclera anicteric. Pupils equal sluggish. Mouth/Throat: Mucous membranes normal, oral pharynx clear   Neck:  ET tube   Lungs:   Clear to auscultation bilaterally, good effort   CV:  Regular rate and rhythm,no murmur, click, rub or gallop   Abdomen:   Soft, non-tender.  bowel sounds normal. non-distended   Extremities: No cyanosis or edema   Skin: Skin color, texture, turgor normal. no acute rash or lesions   Lymph nodes: Cervical and supraclavicular normal   Musculoskeletal: No swelling or deformity   Lines/Devices:  Intact, no erythema, drainage or tenderness   Neuro:  Poorly responsive, no abnormal movement, did not appreciate withdrawal to painful stimuli       LABS AND  DATA: Personally reviewed  Recent Labs     22  0440 22  1129   WBC 3.7* 3.7*   HGB 7.6* 8.1*   HCT 23.0* 25.5*   PLT 80* 92*     Recent Labs     22  0440 22  0448    141   K 3.7 3.6   * 115*   CO2 19* 13*   BUN 9 12   CREA 0.87 0.63*   * 63* CA 7.9* 7.0*   MG 1.6 1.1*   PHOS 1.2*  --      Recent Labs     01/13/22  0440 01/12/22  0448   AP 61 46   TP 4.5* 4.4*   ALB 1.9* 1.8*   GLOB 2.6 2.6     No results for input(s): INR, PTP, APTT, INREXT in the last 72 hours. No results for input(s): PHI, PCO2I, PO2I, FIO2I in the last 72 hours. No results for input(s): CPK, CKMB, TROIQ, BNPP in the last 72 hours. Hemodynamics:   PAP:   CO:     Wedge:   CI:     CVP:    SVR:       PVR:       Ventilator Settings:  Mode Rate Tidal Volume Pressure FiO2 PEEP                    Peak airway pressure:      Minute ventilation:          MEDS: Reviewed    Chest X-Ray:  CXR Results  (Last 48 hours)               01/13/22 1549  XR CHEST PORT Final result    Impression:  Mild patchy airspace opacity in the right lung base   Interval which may represent pneumonia       Narrative:  EXAM: XR CHEST PORT       INDICATION: crackles       COMPARISON: 1/13/2022       FINDINGS: A portable AP radiograph of the chest was obtained at 1546 hours. The   patient is on a cardiac monitor. Mild patchy airspace opacity in the right lung   base new in the interval.. The cardiac and mediastinal contours and pulmonary   vascularity are normal.  The bones and soft tissues are grossly within normal   limits. Assessment and Plan:    cardiac arrest:  This is likely due to hypoxia and septic shock, his hypoxia is out of proportion to his CAT scan chest finding. Maintain normothermia. Monitor neurologically to evaluate recovery. The patient is moving all extremities-not a candidate for TTM. Complete set of labs ordered and pending at this time   Acute hypoxic respiratory failure: Intubated during cardiac arrest, oxygenation improved on positive pressure ventilation. Continue mechanical ventilation in presence of severe septic shock.  Aspiration pneumonia: This is most likely giving CAT scan finding and clinical condition, no fever no significant leukocytosis.   He has been on levofloxacillin. Reported anaphylactic reaction to cephalosporin. I will add aztreonam and vancomycin. Follow-up on culture and sensitivity. Bedside focused TTE showed hyperdynamic LV and RV function consistent with septic physiology. We will obtain a formal echo tomorrow.  Encephalopathy:  Multifactorial.  Ammonia level was normal on the 12th. No reports of benzodiazepine administration [last dose of Librium was on the 11th] however I still suspect chronic liver disease is contributing to this presentation as well as hypoxia and infection.  Severe protein calorie malnutrition: We will start tube feeding when appropriate. At this time I will continue with banana bag daily.  High clinical suspicion for chronic liver disease [alcohol-related]:  Given history of extensive alcohol abuse, thrombocytopenia macrocytic anemia low albumin . No significant elevation in ammonia or transaminases or bilirubin   Chronic macrocytic anemia:  Transfuse for hemoglobin below 7          CRITICAL CARE CONSULTANT NOTE  I had a face to face encounter with the patient, reviewed and interpreted patient data including clinical events, labs, images, vital signs, I/O's, and examined patient. I have discussed the case and the plan and management of the patient's care with the consulting services, the bedside nurses and the respiratory therapist.      NOTE OF PERSONAL INVOLVEMENT IN CARE   This patient has a high probability of imminent, clinically significant deterioration, which requires the highest level of preparedness to intervene urgently. I participated in the decision-making and personally managed or directed the management of the following life and organ supporting interventions that required my frequent assessment to treat or prevent imminent deterioration. I personally spent 60 minutes of critical care time.   This is time spent at this critically ill patient's bedside actively involved in patient care as well as the coordination of care and discussions with the patient's family. This does not include any procedural time which has been billed separately. Paul Maya M.D.   Staff Intensivist/Anesthesiologist  [facility]  1/13/2022

## 2022-01-13 NOTE — PROGRESS NOTES
[facility]  Hospitalist Group                                                                                          Hospitalist Progress Note  Ann-Marie Madison MD  Answering service: [phone] from in house phone        Date of Service:  2022  NAME:  Rancho Foreman  :  1950  MRN:  [id number]      Admission Summary:     Rancho Foreman is a 80 y.o. male who presents with confusion in setting of incompletely treated UTI, now for cont rx in hospital setting. Lactic acid normalizes, ivf/bolus/ivf cont and Levaquin dosed by pharmacy on gong, cutl urine/blood neg ;pending to date  Wbc nl and pt afeb, alert appropriate non focal and asymptomatic. Has chronic obstructive uropathy  ( intol to FLomax)      Onset of symptoms was gradual with gradually worsening course since that time. The pain is located - no pain . Patient describes the pain as na  and rated as na . Pain has been associated with na. Patient denies na. Symptoms are aggravated by na. Symptoms improve withna. Previous studies include na  Pt notes drinking 2-3 coctails a day, CIWA started, thiamin.  Pt denies h/o or current tremors/dt's .       Interval history / Subjective:     Patient poor historian, he said he feels weaker, poor appetite      Assessment & Plan:     Sepsis possible due to UTI and aspiration pneumonia   -patient diagnosed with UTI 2 weeks ago but don't complete his abx  -has leukopenia, tachycardia, tachypnia, thrombocytopenia and low temp 96.2  -patient is allergic to keflex, on levaquin, add azithromycin and vancomycin  -IVF on hold due to congestion of lung, will give albumin 25 gm iv q 6 hrs x 4 doses, if CT negative for pulmonary edema, will give Normal saline   -blood and urine cx negative  -chest x ray show mild patchy airspace opacity in the right lung base Interval which may represent pneumonia  -COVID 19 test negative on   -paired blood cx, check ABG, CT chest  -Intensivist consulted for higher level of care    Acute metabolic encephalopathy   -possible due to sepsis vs CO2 retention, check ABG and ammonia level  -patient progressively lethargic   -ammonia level on 1/12 normal, repeat ammonia level  -continue nuero check  -seen by neurologist    Alcohol abuse   -watch for alcohol withdrawal syndrome  -on lorazepam per [facility] protocol  -continue folic acid, thiamine   -US of abdomen liver demonstrates increased echogenicity,Mildly dilated common bile duct    -advised to stop alcohol    Hypoglycemia due to poor oral intake  -improving    Metabolic acidosis   -possible due to sepsis and ketones from alcohol  -improving, on sodium bicarbonate, monitor CO2     Anemia of chronic disease  -Hgb trending down, no overt bleeding  -stool hem occult negative  -iron profile normal, folate and vitamin B12 normal  -possible due to alcohol bone marrow suppression   -monitor H/H and transfuse for Hgb <7.0    Thrombocytopenia likely due to alcohol  -PLT 80  -monitor platelet     Hypophosphatemia   -iv sodium phosphate infusion    Hypomagnesemia  -iv magnesium sulfate 2 gm    Tobacco abuse  -no hx of COPD  -Advised to stop alcohol    Protein calorie malnutrition   -poor oral intake and alcohol  -nutrition supplement       Full Code : at risk for decompensation and inpatient mortality, son at bedside, he wants aggressive care.       Code status: Full Code  DVT prophylaxis: SCD    Care Plan discussed with: Patient/Family, Nurse and   Anticipated Disposition: Home w/Family  Anticipated Discharge: Greater than 48 hours     Updated the patient and son at bedside, the clinical condition and lab finding, son worries that his father is sicker, discussed about care plan including consultation to ICU team. questions answered     Hospital Problems  Never Reviewed          Codes Class Noted POA    Metabolic encephalopathy ESTEBAN-04-TL: G93.41  ICD-9-CM: 348.31  1/12/2022 Unknown        Severe sepsis ([facility] [address] ICD-10-CM: A41.9, R65.20  ICD-9-CM: 038.9, 995.92  1/9/2022 Unknown                Vital Signs:    Last 24hrs VS reviewed since prior progress note. Most recent are:  Visit Vitals  /61   Pulse (!) 109   Temp 97.8 °F (36.6 °C)   Resp 22   Ht 5' 8.5\" (1.74 m)   Wt 45.9 kg (101 lb 3.1 oz)   SpO2 100%   BMI 15.16 kg/m²         Intake/Output Summary (Last 24 hours) at 1/13/2022 1234  Last data filed at 1/13/2022 0800  Gross per 24 hour   Intake 720 ml   Output 100 ml   Net 620 ml        Physical Examination:     I had a face to face encounter with this patient and independently examined them on 1/13/2022 as outlined below:          Constitutional:  No acute distress, cooperative, pleasant    ENT:  Oral mucosa moist, oropharynx benign. Resp:  Coarse crepitation bilaterally. No wheezing/rhonchi/rales. No accessory muscle use   CV:  Regular rhythm, normal rate, no murmurs, gallops, rubs    GI:  Soft, non distended, non tender. normoactive bowel sounds, no hepatosplenomegaly     Musculoskeletal:  No edema,      Neurologic:  Lethargic but wakeful, follows simple command, moves all extremities. CN II-XII reviewed            Data Review:    Review and/or order of clinical lab test  Review and/or order of tests in the radiology section of CPT  Review and/or order of tests in the medicine section of CPT      Labs:     Recent Labs     01/13/22 0440 01/12/22  1129   WBC 3.7* 3.7*   HGB 7.6* 8.1*   HCT 23.0* 25.5*   PLT 80* 92*     Recent Labs     01/13/22  0440 01/12/22  0448 01/11/22  0508    141 139   K 3.7 3.6 2.9*   * 115* 111*   CO2 19* 13* 11*   BUN 9 12 17   CREA 0.87 0.63* 0.88   * 63* 61*   CA 7.9* 7.0* 7.7*   MG 1.6 1.1*  --    PHOS 1.2*  --   --      Recent Labs     01/13/22  0440 01/12/22  0448   ALT 15 15   AP 61 46   TBILI 0.4 0.4   TP 4.5* 4.4*   ALB 1.9* 1.8*   GLOB 2.6 2.6     No results for input(s): INR, PTP, APTT, INREXT in the last 72 hours.    Recent Labs     01/12/22  1129   TIBC 137*   PSAT 36      Lab Results   Component Value Date/Time    Folate 6.7 01/12/2022 11:30 AM      No results for input(s): PH, PCO2, PO2 in the last 72 hours. No results for input(s): CPK, CKNDX, TROIQ in the last 72 hours.     No lab exists for component: CPKMB  No results found for: CHOL, CHOLX, CHLST, CHOLV, HDL, HDLP, LDL, LDLC, DLDLP, TGLX, TRIGL, TRIGP, CHHD, CHHDX  Lab Results   Component Value Date/Time    Glucose (POC) 165 (H) 01/13/2022 11:07 AM    Glucose (POC) 147 (H) 01/13/2022 06:29 AM    Glucose (POC) 179 (H) 01/12/2022 09:40 PM    Glucose (POC) 125 (H) 01/12/2022 05:34 PM     Lab Results   Component Value Date/Time    Color PINK 01/10/2022 03:30 AM    Appearance CLOUDY (A) 01/10/2022 03:30 AM    Specific gravity 1.025 01/10/2022 03:30 AM    pH (UA) 6.0 01/10/2022 03:30 AM    Protein TRACE (A) 01/10/2022 03:30 AM    Glucose Negative 01/10/2022 03:30 AM    Ketone TRACE (A) 01/10/2022 03:30 AM    Urobilinogen 1.0 01/10/2022 03:30 AM    Nitrites Negative 01/10/2022 03:30 AM    Leukocyte Esterase TRACE (A) 01/10/2022 03:30 AM    Epithelial cells FEW 01/10/2022 03:30 AM    Bacteria Negative 01/10/2022 03:30 AM    WBC 0-4 01/10/2022 03:30 AM    RBC  01/10/2022 03:30 AM         Medications Reviewed:     Current Facility-Administered Medications   Medication Dose Route Frequency    guaiFENesin ER (MUCINEX) tablet 600 mg  600 mg Oral Q12H    arformoteroL (BROVANA) neb solution 15 mcg  15 mcg Nebulization BID RT    And    budesonide (PULMICORT) 500 mcg/2 ml nebulizer suspension  500 mcg Nebulization BID RT    albuterol-ipratropium (DUO-NEB) 2.5 MG-0.5 MG/3 ML  3 mL Nebulization Q4H PRN    pantoprazole (PROTONIX) injection 40 mg  40 mg IntraVENous Q12H    And    sodium chloride (NS) flush 10 mL  10 mL IntraVENous Q12H    thiamine (B-1) 100 mg in 0.9% sodium chloride 50 mL IVPB  100 mg IntraVENous DAILY    chlordiazePOXIDE (LIBRIUM) capsule 10 mg  10 mg Oral TID    dextrose 5% - 0.9% NaCl with KCl 20 mEq/L infusion  75 mL/hr IntraVENous CONTINUOUS    insulin lispro (HUMALOG) injection   SubCUTAneous AC&HS    glucose chewable tablet 16 g  4 Tablet Oral PRN    dextrose (D50W) injection syrg 12.5-25 g  12.5-25 g IntraVENous PRN    glucagon (GLUCAGEN) injection 1 mg  1 mg IntraMUSCular PRN    cholecalciferol (VITAMIN D3) (400 Units /10 mcg) tablet 400 Units  400 Units Oral DAILY    sodium bicarbonate tablet 650 mg  650 mg Oral TID    potassium chloride SR (KLOR-CON 10) tablet 20 mEq  20 mEq Oral BID    influenza vaccine 2021-22 (6 mos+)(PF) (FLUARIX/FLULAVAL/FLUZONE QUAD) injection 0.5 mL  1 Each IntraMUSCular PRIOR TO DISCHARGE    levoFLOXacin (LEVAQUIN) 250 mg in D5W IVPB  250 mg IntraVENous Q24H    doxazosin (CARDURA) tablet 2 mg  2 mg Oral DAILY    balsam peru-castor oiL (VENELEX) ointment   Topical BID    folic acid (FOLVITE) tablet 1 mg  1 mg Oral DAILY    LORazepam (ATIVAN) injection 2 mg  2 mg IntraVENous Q1H PRN    LORazepam (ATIVAN) injection 4 mg  4 mg IntraVENous Q1H PRN     ______________________________________________________________________  EXPECTED LENGTH OF STAY: 3d 12h  ACTUAL LENGTH OF STAY:          4                 Bryan De Anda MD

## 2022-01-13 NOTE — PROGRESS NOTES
Transitions of Care Plan   RUR: 13% - low   Clinical Update: monitor hgb   Consults: Therapy; Neurology   Baseline: ambulates with RW; resides with roommate   Barrier(s) to Discharge: medical   Disposition: home health    Estimated Discharge Date: 2+ days    Clinical update per chart review and/or patient discussed during Interdisciplinary Rounds:    Patient is not medically stable for discharge due to ongoing medical needs. CM continues to follow treatment plan for medical progress and disposition needs.     Disposition:  Home with home health    Slade Ruth, [address]  Available via [facility] or

## 2022-01-14 NOTE — PROGRESS NOTES
[facility]    ICU TEAM Progress Note    Name: Aaron Dunlap   : 1950   MRN: [id number]   Date: 2022      Assessment / Plan:     Cardiac arrest  PEA arrest s/p initiation of ACLS algorithm with ROSC  Echocardiogram  Continue to trend troponin  Follow serial cardiac enzymes / EKG's    Acute respiratory failure  S/p intubation / MV  Ventilator settings optimized  Current PEEP:5, current fiO2:60%    On low dose Precedex and Fentanyl for sedation. Discussed weaning Precedex in favor of fentanyl with nursing staff  Hemodynamic instability precludes liberation from mechanical ventilation    Sepsis with septic shock  Blood and urine cultures pending. Obtain lower respiratory tract cultures. Obtain peripheral markers of infection including procalcitonin level, Strep pneumo and Legionella urinary antigens  On empiric broad spectrum antibiotic therapy with Vanc, Aztreonam, Pip/Tazo  Adjust antibiotic regimen based on culture results accordingly    More aggressive IVF hydration with normal saline  On vasopressor therapy with Vasopressin and norepinephrine infusion - titrate to maintain MAP >65. Continue to trend lactic acid levels to confirm decline    Anemia  No clear source of infection evident at bedside  Transfuse 1 unit PRBC's today  Continue to follow serial H&H and transfuse as necessary    Liver dysfunction  Coagulopathy  Acute encephalopathy  Toxic / metabolic etiology suspected  There may be a more chronic component after obtaining further history from family  Wernicke's encephalopathy considered by nutrition.  More aggressive thiamine supplementation - 500mg TID x 2 days then 250mg daily x 5 days, then 100mg therafter    Acute renal insufficiency  Decreasing urine output  Follow renal function with more aggressive IVF hydration / maintenance of adequate end organ perfusion    Aspiration pneumonia / pneumonitis    Lactic acidosis          ICU Comprehensive Plan of Care:       F - Feeding: Begin enteral feeds within the next 24 hours  A - Analgesia:   S - Sedation: Fentanyl and Precedex  T - DVT Prophylaxis: SCD's or Sequential Compression Device for now in setting of anemia concerning for acute blood loss and worsening thrombocytopenia   H - Head of Bed: > 30 Degrees  U - Ulcer Prophylaxis: Protonix (pantoprazole) BID IV  G - Glycemic Control: Insulin  S - Spontaneous Breathing Trial: No  B - Bowel Regimen:  I - Indwelling Catheter:   Tubes: ETT and Nasogastric Tube  Lines: Arterial Line and Central Line  Drains: Mcgraw Catheter      Subjective:     Events of overnight reviewed. Mr. Viktor Oconnor remains critically ill, intubated/sedated. Son at bedside. Home Medications:     Prior to Admission medications    Medication Sig Start Date End Date Taking? Authorizing Provider   clopidogreL (PLAVIX) 75 mg tab Take 75 mg by mouth daily. 3/3/21  Yes Other, MD Joseph   ferrous sulfate 325 mg (65 mg iron) tablet Take 1 Tablet by mouth two (2) times a day. 1/15/21  Yes Other, MD Joseph   atenoloL (TENORMIN) 50 mg tablet Take 50 mg by mouth daily. 10/11/21   Other, MD Joseph       Allergies     Allergies   Allergen Reactions    Keflex [Cephalexin] Anaphylaxis        Objective:   Vital Signs:  Visit Vitals  BP 90/60   Pulse 88   Temp 97.7 °F (36.5 °C) Comment: 1 hr post transfusion   Resp 16   Ht 5' 8.5\" (1.74 m)   Wt 41.6 kg (91 lb 11.4 oz)   SpO2 98%   BMI 13.74 kg/m²      O2 Device: Endotracheal tube Temp (24hrs), Av.2 °F (37.3 °C), Min:94.8 °F (34.9 °C), Max:102 °F (38.9 °C)           Intake/Output:     Intake/Output Summary (Last 24 hours) at 2022 0956  Last data filed at 2022 0900  Gross per 24 hour   Intake 2637.79 ml   Output 242 ml   Net 2395.79 ml       Physical Exam:  Gen: Intubated / sedated  HEENT:   Head: Temporal wasting  Eyes: No scleral icterus. PERRL   OP: ETT  Neck: Supple. No cervical or supraclavicular lymphadenopathy  Resp: Adequate thoracic excursion.  Ventilator associated breath sounds throughout. No overt wheezes or rhonchi. CV: regular rate and rhythm. No murmurs, rubs, or gallops  Abd: Mildly distended. Soft. Non-tender to palpation without rebound / guarding  Ext: No lower extremity edema. No cyanosis. : Urinary catheter in place with muddy brown urine proximally and scant altaf colored urine in drainage bag         LABS AND  DATA: Personally reviewed  Recent Labs     01/14/22 0239 01/13/22 1943   WBC 15.9* 15.8*   HGB 6.2* 8.3*   HCT 19.6* 26.4*   PLT 65* 92*     Recent Labs     01/14/22 0239 01/13/22 1943 01/13/22 0440 01/13/22 0440    145   < > 142   K 4.0 3.7   < > 3.7   * 117*   < > 116*   CO2 18* 22   < > 19*   BUN 8 8   < > 9   CREA 0.99 0.92   < > 0.87   * 178*   < > 149*   CA 10.2* 12.4*   < > 7.9*   MG 2.5* 1.7   < > 1.6   PHOS 2.0*  --   --  1.2*    < > = values in this interval not displayed. Recent Labs     01/14/22 0239 01/13/22 1943   AP 39* 54   TP 4.7* 4.6*   ALB 2.6* 1.9*   GLOB 2.1 2.7   LPSE <10*  --      Recent Labs     01/13/22 1943   INR 1.5*   PTP 15.2*   APTT 66.1*      Recent Labs     01/14/22 0338 01/13/22 2031   PHI 7.33* 7.34*   PCO2I 33.6* 34.5*   PO2I 127* 465*   FIO2I 60 100     No results for input(s): CPK, CKMB, TROIQ, BNPP in the last 72 hours.     Hemodynamics:   PAP:   CO:     Wedge:   CI:     CVP:    SVR:       PVR:       Ventilator Settings:  Mode Rate Tidal Volume Pressure FiO2 PEEP   Assist control   480 ml    60 % 5 cm H20     Peak airway pressure: 22 cm H2O    Minute ventilation: 7.99 l/min        MEDICATIONS:  Current Facility-Administered Medications   Medication Dose Route Frequency    phytonadione (vitamin K1) (AQUA-MEPHYTON) 10 mg in 0.9% sodium chloride 50 mL IVPB  10 mg IntraVENous DAILY    guaiFENesin ER (MUCINEX) tablet 600 mg  600 mg Oral Q12H    albumin human 25% (BUMINATE) solution 25 g  25 g IntraVENous Q6H    0.9% sodium chloride 1,000 mL with thiamine 039 mg, folic acid 1 mg infusion IntraVENous Q24H    Vancomycin - Pharmacy dosing   Other Rx Dosing/Monitoring    vancomycin (VANCOCIN) 500 mg in 0.9% sodium chloride (MBP/ADV) 100 mL MBP  500 mg IntraVENous Q12H    aztreonam (AZACTAM) 2 g in sterile water (preservative free) 10 mL IV syringe  2 g IntraVENous Q8H    heparin (porcine) injection 5,000 Units  5,000 Units SubCUTAneous Q8H    chlorhexidine (PERIDEX) 0.12 % mouthwash 15 mL  15 mL Oral Q12H    vasopressin (VASOSTRICT) 20 Units in 0.9% sodium chloride 100 mL infusion  0-0.04 Units/min IntraVENous TITRATE    EPINEPHrine (ADRENALIN) 5 mg in 0.9% sodium chloride 250 mL infusion  1-10 mcg/min IntraVENous TITRATE    NOREPINephrine (LEVOPHED) 8 mg in 5% dextrose 250mL (32 mcg/mL) infusion  0.5-30 mcg/min IntraVENous TITRATE    dexmedeTOMidine in 0.9 % NaCl (PRECEDEX) 400 mcg/100 mL (4 mcg/mL) infusion soln  0.1-1.5 mcg/kg/hr IntraVENous TITRATE    fentaNYL (PF) 1,500 mcg/30 mL (50 mcg/mL) infusion  0-200 mcg/hr IntraVENous TITRATE    insulin lispro (HUMALOG) injection   SubCUTAneous Q6H    arformoteroL (BROVANA) neb solution 15 mcg  15 mcg Nebulization BID RT    And    budesonide (PULMICORT) 500 mcg/2 ml nebulizer suspension  500 mcg Nebulization BID RT    pantoprazole (PROTONIX) injection 40 mg  40 mg IntraVENous Q12H    And    sodium chloride (NS) flush 10 mL  10 mL IntraVENous Q12H    cholecalciferol (VITAMIN D3) (400 Units /10 mcg) tablet 400 Units  400 Units Oral DAILY    sodium bicarbonate tablet 650 mg  650 mg Oral TID    potassium chloride SR (KLOR-CON 10) tablet 20 mEq  20 mEq Oral BID    influenza vaccine 2021-22 (6 mos+)(PF) (FLUARIX/FLULAVAL/FLUZONE QUAD) injection 0.5 mL  1 Each IntraMUSCular PRIOR TO DISCHARGE    levoFLOXacin (LEVAQUIN) 250 mg in D5W IVPB  250 mg IntraVENous Q24H    [Held by provider] doxazosin (CARDURA) tablet 2 mg  2 mg Oral DAILY    balsam peru-castor oiL (VENELEX) ointment   Topical BID          IMAGING:  Chest X-Ray:  CXR Results  (Last 48 hours)               01/13/22 2027  XR CHEST PORT Final result    Impression:  Intubated with an enteric feeding tube placed. Questionable mild   increase in bibasilar airspace opacities       Narrative:  EXAM: XR CHEST PORT       INDICATION: ett       COMPARISON: 1/13/2022       FINDINGS: A portable AP radiograph of the chest was obtained at 2016 hours. The   patient is on a cardiac monitor. Suggestion of mild patchy increased opacity in   the bilateral lung bases. .. Endotracheal tube is 5.5 cm above the jing. Nasogastric tube courses in the stomach. .  The bones and soft tissues are   grossly within normal limits. 01/13/22 1549  XR CHEST PORT Final result    Impression:  Mild patchy airspace opacity in the right lung base   Interval which may represent pneumonia       Narrative:  EXAM: XR CHEST PORT       INDICATION: crackles       COMPARISON: 1/13/2022       FINDINGS: A portable AP radiograph of the chest was obtained at 1546 hours. The   patient is on a cardiac monitor. Mild patchy airspace opacity in the right lung   base new in the interval.. The cardiac and mediastinal contours and pulmonary   vascularity are normal.  The bones and soft tissues are grossly within normal   limits. NOTE OF PERSONAL INVOLVEMENT IN CARE     Patient with multi-system organ failure / dysfunction that presents a constant threat to life. Critical care time of greater than 50  minutes spent at bedside providing critical care services, in discussion of plan of care with staff at bedside, and in review of available medical records. This is exclusive of procedures. Logan TAYLOR.      Critical Care Medicine  [facility]

## 2022-01-14 NOTE — PROGRESS NOTES
Physical Therapy - defer  Chart reviewed in prep for therapy session. Pt remains intubated, not following commands. Will follow for resumption of therapy when appropriate.

## 2022-01-14 NOTE — PROGRESS NOTES
CODE NOTE    Code Blue called while Mr. Evita Dimas was undergoing CT imaging. PEA per report with initiation of chest compressions prior to my arrival. Emergency room physician was intubating patient. I requested administration of 1mg epinephrine administered per ACLS protocol followed by an amp of bicarbonate and calcium gluconate. Subsequently, ROSC was achieved. Mr. Evita Dimas was started on vasopressor therapy with norepinephrine infusion. He actually became hypertensive with bounding pulses and norepinephrine infusion was held. Subsequently, Mr. Evita Dimas became more difficult to ventilate with poor breath sounds on exam. No resistance felt to endotracheal tube inflation. Direct laryngoscopy performed and it became evident that endotracheal tube was not in place/had been dislodged. Arrangements made for reintubation. Please see endotracheal intubation note for further details. Following reintubation, Mr. Evita Dimas again became progressively more hypotensive. He was restarted on vasopressor therapy. Despite this, pulses became more thready progressing to pulselessness / PEA. Chest compressions were promptly resumed per ACLS algorithm followed by administration of epinephrine, amp of bicarbonate, and calcium gluconate. ROSC again achieved. He was urgently transferred to the ICU for further care. For vEita Dimas with organ system failure/dysfunction that presents a constant threat to life. Critical care time of greater than 40 minutes spent at bedside providing critical care services, and discussion of plan of care with staff at bedside. This is exclusive of any procedures.

## 2022-01-14 NOTE — PROGRESS NOTES
1930: TRANSFER - IN REPORT:    Verbal report received from Andrew(name) on Ab Snider  being received from [facility](unit) for change in patient condition(post-code blue)      Report consisted of patients Situation, Background, Assessment and   Recommendations(SBAR). Information from the following report(s) SBAR, Kardex, ED Summary, OR Summary, Procedure Summary, Intake/Output, MAR, Recent Results, Cardiac Rhythm SR/ST and Alarm Parameters  was reviewed with the receiving nurse. Opportunity for questions and clarification was provided. Assessment completed upon patients arrival to unit and care assumed. Primary Nurse Connor Zayas RN and JEANETTE Paula performed a dual skin assessment on this patient Impairment noted- see wound doc flow sheet  Virgil score is 9. Pt on [place] wound care bed.    1945: Lines and gtts verified. 2000Jasommer Jung MD at bedside to place CVC and a-line. 2005: Mcgraw placed. Unable to obtain axillary temp. Rectal temp sensor placed. Temp 34.9. Doug hugger placed on pt. 0230: Labs drawn. HGB 6.2. Opal Espinal MD aware. Orders received for 1 unit PRBC and vit K x3 doses. 0400: Temp 38.9. Tylenol given and ice packs placed on pt. 0440: 1 unit PRBC transfusing. No transfusion reaction noted. 0730: Bedside and Verbal shift change report given to Nelson Wright RN (oncoming nurse) by Martyn Schwab, RN (offgoing nurse). Report included the following information SBAR, Kardex, ED Summary, OR Summary, Procedure Summary, Intake/Output, MAR, Recent Results, Cardiac Rhythm ST and Alarm Parameters .

## 2022-01-14 NOTE — PROGRESS NOTES
Follow up visit  in Room 4220. Patient non-communicative. His son Leah Carrion was at his bedside. Leah Carrion emotionally shared his concerns about his father medical care. He continued on and shared that he felt all his concerns have been addressed - \"I'm just hoping its not to late\". Provided empathic listening,ministry of silent listening presence, explored mental and emotional needs; and advocated for family to insure his concerns were address. Advised of  availability. Visited by: Georgie Shepherd.  Kaci , [address] [place] [address] Road paging Service [phone]

## 2022-01-14 NOTE — PROGRESS NOTES
0730 Report received from [facility]. Levo @30, Vaso @0.04, Fent @50, Precedex @1, KVO x3 @5, RBC transfusing. 0830 Weaning sedation as tolerated to assess neurological status. 1000 IDR held. RN addressed minimal de la rosa output and inability to obtain SpO2 reading, NNO at this time. Made MD aware of pt's bilateral eyes deviate upward. 1199 [facility]  Patient with hypoglycemic episode(s) at 1216(time) on 1/14/2022(date). BG value(s) pre-treatment 76    Was patient symptomatic? [] yes, [x] no  Patient was treated with the following rescue medications/treatments: [x] D50  BG value post-treatment: 111  Once BG treated and value greater than 80mg/dl, pt was provided with the following: N/A   Name of MD notified:MD Paty  The following orders were received: NNO.    1220 While pushing 12.5g of IV D50 and Azactam IVP, pt's MAP dropped from 77 to 53 in two minutes. Epi gtt started @1, Albumin infusing. Will titrate off Epi gtt as tolerated. Called pharmacist, Nelson Brantley, and clarified this is not a reaction to Azactam.     1430 Dr. Chadwick Burnette at bedside updating pt's son on plan of care. Labs ordered and obtained. Orders to wean off Precedex. IVF bolus and wean Levo if tolerates. 1500 Wound care at bedside. 1520 IVF bolus ordered and infusing. Current MAP <65, MD aware. Maintenance IV fluids ordered. 1545 Echo at bedside. 1630 Repeat Hgb 7.9, addressed with MD.     1640 HR 130s, MAP 64-66 on Levo @30, Vaso @0.04, 1L NS bolus completed, IVF infusing. Paged Dr. Chadwick Burnette. Ollie gtt ordered. 1700 Pt unable to tolerate turns. MAP drops to 50s after turning and repositioning. Made MD aware of pt's bilateral eyes deviating upward when open. Plan for CT head possible tomorrow if more stable. 1730 Ollie gtt started, MAP 60s. 1930 Bedside and Verbal shift change report given to Cierra Ta RN (oncoming nurse) by Kareem Hutchison RN (offgoing nurse). Report included the following information SBAR. Levo @30, Vaso @0.04, Ollie @100, Fent @50, D5 NS @100, Paula Trotter @5.

## 2022-01-14 NOTE — PROGRESS NOTES
1915 Pt arrived to CCU. Received bedside report from Liliam Joseph, [facility] at bedside. Pt s/p Code Blue in CT scan. Dr. Marshall Gillespie at bedside placing left subclavian CVC and right brachial arterial line. Orders for de la rosa, restraints, sedation, labs, and additional pressor support. Pt currently has Epi @10 and Levo @20. Labs obtained and sent. Son in waiting room at this time. 1930 Bedside and Verbal shift change report given to Tita Newman RN (oncoming nurse) by Kala Miller RN (offgoing nurse). Report included the following information SBAR.

## 2022-01-14 NOTE — PROGRESS NOTES
Chart reviewed, patient received sedated and on vent. Per ABCDEF protocol, will work with patient when PEEP is 10.0 or less, FIO2 60% or less, and patient is following basic commands (PEEP 5, FiO2 60%, however patient unable to follow commands). Noted patient with Code Blue yesterday and will need re-evaluation. Will follow patient peripherally. Recommend nursing to complete with patient, as able and per protocol, in order to promote cardiopulmonary systems, maintain strength, endurance and independence:   -bed in chair position or maximize full reverse Trendelenburg position to facilitate upright activity with foot board and non-skid footwear on 3x/day ~30-60 mins each   -ROM during bathing B UEs and LEs  -positioning to prevent contractures and edema  RASS -1/0/+1 (during SAT)  Active ROM   Sitting (bed in chair position)   Standing (reverse Trendelenburg)   ADLs   RASS -3/2  Passive ROM   Sit (bed in chair position)   RASS -5/-4  Passive ROM       Thank you for your assistance.

## 2022-01-14 NOTE — WOUND CARE
WOCN Note:      Reconsult for assessment of arms, buttocks and sacrum.     Assessed in room 4220 with Mariah REID. Chart reviewed. Admitted DX:  Severe sepsis       Past Medical History:   Diagnosis Date    ETOH abuse      Hypertension      UTI (urinary tract infection)        Assessment:   Patient is intubated, non responsive and requires assist with repositioning. Bed: [facility]  Patient repositioned on right side with pillow. Heels offloaded with pillows. Heels and feet have purple mottling.      1. POA Sacrum extended to buttocks (wound continued to evolve since admission), Deep Tissue Injury that is partially unroofed:  10 x 5 x 0.1 cm; 50% non blanching red and 50% non blanching maroon. Scant serosang exudate. Cleansed with saline; applied Venelex; covered prominent sacral bone with sacral foam dressing. 2.  Right arm, weepy edema. Cleansed with saline; apply Xeroform; covered with dry dressing. Wound, Pressure Prevention & Skin Care Recommendations:    1. Minimize layers of linen/pads under patient to optimize support surface. 2.  Turn/reposition approximately every 2 hours and offload heels. 3.  Manage moisture/ Keep skin folds clean and dry. 4.  Sacrum:  Venelex TID and sacral foam  5. Buttocks:  Venelex TID  6. Arm:  Daily and as needed cleanse with saline; apply Xeroform; covered with dry dressing. 7.  Bilateral feet:  Apply Venelex TID. 8.  Offload ears with zflow pillow.     Discussed above plan with Mariah REID.     Transition of Care:   Plan to follow as needed while admitted to hospital.     JONN Gore RN [facility]  Available on Perfect Serve  Pager [phone]  Office [phone]

## 2022-01-14 NOTE — PROCEDURES
Following confirmation that endotracheal tube was not in place/had been dislodged, endotracheal tube completely removed. Video laryngoscope with MAC 3 blade inserted. Grade 1 view obtained. 7.5 mm endotracheal tube with stylette advanced past the vocal cords. Stylette removed and endotracheal tube further advanced with successful intubation of the trachea. Color change noted on capnography. Condensation noted within endotracheal tube. Auscultation performed. Bag mask ventilation as arrangements were being made for transfer to ICU.

## 2022-01-14 NOTE — PROGRESS NOTES
Pharmacist Note - Vancomycin Dosing    Consult provided for this 80 y.o. male for indication of sepsis. Antibiotic regimen(s): levofloxacin  Patient on vancomycin PTA? NO     Recent Labs     22  0440 22  1129 22  0448 22  0508 22  0508   WBC 3.7* 3.7* 2.6*   < > 3.5*   CREA 0.87  --  0.63*  --  0.88   BUN 9  --  12  --  17    < > = values in this interval not displayed. Frequency of BMP: every day x3  Height: 174 cm  Weight: 45.9 kg  Est CrCl: 51 ml/min; UO: n/a ml/kg/hr  Temp (24hrs), Av.5 °F (36.4 °C), Min:96.2 °F (35.7 °C), Max:98.1 °F (36.7 °C)    Cultures:  No new    MRSA Swab ordered (if applicable)? NO    The plan below is expected to result in a target range of AUC/NATALYA 400-600    Therapy will be initiated with a loading dose of 1000 mg IV x 1 to be followed by a maintenance dose of 500 mg IV every 12 hours. Pharmacy to follow patient daily and order levels / make dose adjustments as appropriate. *Vancomycin has been dosed used Bayesian kinetics software to target an AUC/NATALYA of 400-600, which provides adequate exposure for an assumed infection due to MRSA with an NATALYA of 1 or less while reducing the risk of nephrotoxicity as seen with traditional trough based dosing goals.

## 2022-01-14 NOTE — PROGRESS NOTES
Responded to page to provide support for the family of this pt in [facility] 0115. Facilitated life review to assess potential support needs or coping strategies. Provided support as pt's son processed thoughts and feelings about this admission for pt. Offered listening presence for pt's son and affirmed son's feelings as he coped with pt's medical situation. Dino Casillas MDiv.  Staff   Request  Support/Spiritual Care Services on [phone])

## 2022-01-14 NOTE — PROGRESS NOTES
0730: Bedside and Verbal shift change report given to [facility] (oncoming nurse) by Isidro Wei (offgoing nurse). Report included the following information SBAR, Kardex, Intake/Output, MAR, Accordion, Recent Results and Cardiac Rhythm sinus tach. 1130: spoke with Dr. Ruy Contreras regarding patient coarse lung sounds. Mucinex ordered, see MAR    1230: spoke with Dr. Ruy Contreras and updated on current patient condition being very lethargic and tachypnic. Orders for chest xray and ABD US to be entered by MD. Pending xray orders per MD potential for lasix if shows effusion    1342: Ultrasound at bedside    1543: xray at bedside    1655: updated Dr. Ruy Contreras that imaging has been resulted for his review    56: spoke with Dr. Ruy Contreras, per MD suspect for pneumonia on chest xray. ABX ordered, see MAR    1724: perfect serve message to Dr. Ciera Quan just looks worse this evening than earlier today, I am concerned with his work of breathing\". 1725: Dr. Ruy Contreras message returned \"I will see him\"    18: Dr. Ruy Contreras at bedside to assess. Per MD wants chest CT first and potentially lasix depending on CT results. Per MD will consult intensivist and obtain ABG    1800: patient off unit to CT with RN and monitor    1819: patient completed CT scan and being removed from scanner, RN assesses patient go unresponsive, alexandre down to 30s-40s and respiratory arrest, no palpable pulse. Code Blue called at this time    [phone]: CPR initiated, blood sugar obtained at 150    1823: successful intubation with 7.5 EET. Positive breath sounds and color change on capnography    1824: pulse check, no palpable pulse. CPR resumed. Epi given    1825: calcium given    1826: bicarb given    1827: pulse check = thready but palpable pulse    1829:  /103    1833: MD to change out ETT due to dislodging, patient etubated, resumed respirations with ambu bag.  20 etomidate given    1834: successful intubation     1835:  137/ 85    1842: BP 74/46  Levo started at 4 mcg per MD running code    60 920 56 25: loss of pulse, CPR initiated. Epi and Bicarb given    1845: calcium given    1846: pulse check, remains pulseless- CPR resumed    1847: epi given    1848: positive pulses carotid and femoral.  BP 84/54 95% O2    1851: levo increased to 10mcg per MD running code    1852: calcium given,  91%O2    1853: levo increased to 20mcg per MD running code    1854: /91     1858: bicarb given, epi given. Thready but palpable pulse    1859: epi gtt initiated at 10 mcg per MD running code. Bed in CCU ready, patient transport to room initiated    1904: patient arrived to CCU    1905: TRANSFER - OUT REPORT:    Verbal report given to Jaz Barraza RN(name) on Nely Degree  being transferred to CCU(unit) for urgent transfer       Report consisted of patients Situation, Background, Assessment and   Recommendations(SBAR). Information from the following report(s) SBAR, Kardex, Intake/Output, MAR, Accordion, Recent Results and Cardiac Rhythm sinus tach was reviewed with the receiving nurse. Opportunity for questions and clarification was provided.       Patient transported with:   Monitor  Registered Nurse x 4  RT   intensivist

## 2022-01-14 NOTE — PROCEDURES
[facility]      Procedure Note - Arterial Access:   Performed by Soy Cunningham MD.  Diagnosis: Septic Shock  Insertion Date: 01/13/22   Time: 10:45 PM   Obtained Consent? no; emergent   Procedure Location:  ICU. Immediately prior to the procedure, the patient was reevaluated and found suitable for the planned procedure and any planned medications. Immediately prior to the procedure a time out was called to verify the correct patient, procedure, equipment, staff, and marking as appropriate. Line Bundle:  Full sterile barrier precautions used. 5 mL 1% Lidocaine placed at insertion site. Method: Seldinger technique. Site Prep: ChloraPrep. Procedure: Arterial Catheter Insertion in Right, Brachial Artery   Catheter inserted into a new site. Number of Attempts:  1 Indication: Monitoring and Blood Drawing. There was bright red, pulsatile blood return. Femoral Site? no. If Yes, reason femoral site was chosen:   Catheter secured. Biopatch in place? yes. Sterile Bio-occlusive dressing placed. Complication None. The procedure was tolerated well.     Soy Cunningham MD   Critical Care Medicine  [facility]

## 2022-01-14 NOTE — PROCEDURES
[facility]      Procedure Note - Central Venous Access:   Performed by Sergey Monson MD  Diagnosis: Septic Shock  Insertion Date: 01/13/22  Time:10:46 PM  Obtained Consent? no; emergent   Procedure Location:  ICU. Immediately prior to the procedure, the patient was reevaluated and found suitable for the planned procedure and any planned medications. Immediately prior to the procedure a time out was called to verify the correct patient, procedure, equipment, staff, and marking as appropriate. Central line Bundle:  Full sterile barrier precautions used. 7-Step Sterility Protocol followed. (cap, mask sterile gown, sterile gloves, large sterile sheet, hand hygiene, 2% chlorhexidine for cutaneous antisepsis)  5 mL 1% Lidocaine placed at insertion site. Patient positioned in Trendelenburg?yes   The site was prepped with ChloraPrep. Catheter inserted into a new site. Using Seldinger technique a Arrow Triple Lumen CVC was placed in the Left, Subclavian Vein via direct cannulation with 1 number of attempts for Blood Drawing and IV Access. Ultrasound Guidance was utilized. There was good dark, non-pulsatile blood return in all ports. Femoral Site? no. If Yes, reason femoral site was chosen:   Catheter secured. Biopatch in place? yes. Sterile Bio-occlusive dressing placed. The following complications were encountered: None. A follow-up chest x-ray was ordered post procedure. The procedure was tolerated well.         Sergey Monson MD  Critical Care Medicine  [facility]

## 2022-01-14 NOTE — PROGRESS NOTES
[facility]  Hospitalist Group     ICU Transfer/Accept Summary     This patient is being transferred INTO THE ICU  DATE OF TRANSFER: 1/13/2022       PATIENT ID: Brenda Woo  MRN: [id number]   YOB: 1950    PRIMARY CARE PROVIDER: Fernando Resendiz MD   DATE OF ADMISSION: 1/9/2022  6:41 PM    ATTENDING PHYSICIAN: Valdez Chen MD  CONSULTATIONS:   IP CONSULT TO HOSPITALIST  IP CONSULT TO NEUROLOGY    PROCEDURES/SURGERIES:   * No surgery found *    REASON FOR ADMISSION: <principal problem not specified>     HOSPITAL PROBLEM LIST:  Patient Active Problem List   Diagnosis Code    [facility] A41.9, D51.68    Metabolic encephalopathy W50.79         Brief HPI and Hospital Course:      Narinder Turk a 80 y. o. male who presents with confusion in setting of incompletely treated UTI, now for cont rx in hospital setting. Lactic acid normalizes, ivf/bolus/ivf cont and Levaquin dosed by pharmacy on gong, cutl urine/blood neg ;pending to date  Wbc nl and pt afeb, alert appropriate non focal and asymptomatic. Has chronic obstructive uropathy  ( intol to FLomax)      Onset of symptoms was gradual with gradually worsening course since that time. The pain is located - no pain . Patient describes the pain as na  and rated as na . Pain has been associated with na. Patient denies na. Symptoms are aggravated by na. Symptoms improve withna.  Previous studies include na  Pt notes drinking 2-3 coctails a day, CIWA started, thiamin.  Pt denies h/o or current tremors/dt's .       Assessment and Plan:    Cardiorespiratory arrest on 1/13  -patient coded in CT room after he had CT chest   -sucessfully resuscitated   -intubated  -transferred to CCU  -management per intensivist     Sepsis possible due to UTI and aspiration pneumonia   -patient diagnosed with UTI 2 weeks ago but didn't complete his abx  -has leukopenia, tachycardia, tachypnia, thrombocytopenia and low temp 96.2  -patient is allergic to keflex, on levaquin, add azithromycin and vancomycin  -IVF on hold due to congestion of lung, will give albumin 25 gm iv q 6 hrs x 4 doses, if CT negative for pulmonary edema, will give Normal saline   -blood and urine cx negative  -chest x ray show mild patchy airspace opacity in the right lung base Interval which may represent pneumonia  -COVID 19 test negative on 1/9  -paired blood cx, check ABG, CT chest  -Intensivist consulted for higher level of care     Acute metabolic encephalopathy   -possible due to sepsis vs CO2 retention, check ABG and ammonia level  -patient progressively lethargic   -ammonia level on 1/12 normal, repeat ammonia level  -continue nuero check  -seen by neurologist     Alcohol abuse   -watch for alcohol withdrawal syndrome  -on lorazepam per [facility] protocol  -continue folic acid, thiamine   -US of abdomen liver demonstrates increased echogenicity,Mildly dilated common bile duct    -advised to stop alcohol     Hypoglycemia due to poor oral intake  -improving     Metabolic acidosis   -possible due to sepsis and ketones from alcohol  -improving, on sodium bicarbonate, monitor CO2     Anemia of chronic disease  -Hgb trending down, no overt bleeding  -stool hem occult negative  -iron profile normal, folate and vitamin B12 normal  -possible due to alcohol bone marrow suppression   -monitor H/H and transfuse for Hgb <7.0     Thrombocytopenia likely due to alcohol  -PLT 80  -monitor platelet      Hypophosphatemia   -iv sodium phosphate infusion     Hypomagnesemia  -iv magnesium sulfate 2 gm     Tobacco abuse  -no hx of COPD  -Advised to stop alcohol     Protein calorie malnutrition   -poor oral intake and alcohol  -nutrition supplement         Full Code : at risk for decompensation and inpatient mortality, son at bedside, he wants aggressive care.        Code status: Full Code  DVT prophylaxis: SCD           PHYSICAL EXAMINATION:  Visit Vitals  BP (!) 100/51 (BP 1 Location: Right arm, BP Patient Position: At rest)   Pulse 98   Temp (!) 96.2 °F (35.7 °C)   Resp 30   Ht 5' 8.5\" (1.74 m)   Wt 45.9 kg (101 lb 3.1 oz)   SpO2 92%   BMI 15.16 kg/m²       General:          Intubated, on ventilater  HEENT:           Atraumatic, MMM            Neck:               Supple, symmetrical,  thyroid: non tender  Lungs:             Decrease to bronchial breath sound to auscultation bilaterally. No Wheezing or Rhonchi. No rales. Heart:              Regular  rhythm,  No  murmur   No edema  Abdomen:       Soft, non-tender. Not distended. Bowel sounds normal  Extremities:     No cyanosis. Skin:                Not pale. Not Jaundiced  No rashes   Psych:             Not anxious or agitated. Neurologic:      Sedated    Labs:     Recent Labs     01/13/22  0440 01/12/22  1129   WBC 3.7* 3.7*   HGB 7.6* 8.1*   HCT 23.0* 25.5*   PLT 80* 92*     Recent Labs     01/13/22  0440 01/12/22  0448 01/11/22  0508    141 139   K 3.7 3.6 2.9*   * 115* 111*   CO2 19* 13* 11*   BUN 9 12 17   CREA 0.87 0.63* 0.88   * 63* 61*   CA 7.9* 7.0* 7.7*   MG 1.6 1.1*  --    PHOS 1.2*  --   --      Recent Labs     01/13/22  0440 01/12/22  0448   ALT 15 15   AP 61 46   TBILI 0.4 0.4   TP 4.5* 4.4*   ALB 1.9* 1.8*   GLOB 2.6 2.6     No results for input(s): INR, PTP, APTT, INREXT in the last 72 hours. Recent Labs     01/12/22  1129   TIBC 137*   PSAT 36      Lab Results   Component Value Date/Time    Folate 9.6 01/13/2022 04:40 AM      No results for input(s): PH, PCO2, PO2 in the last 72 hours. No results for input(s): CPK, CKNDX, TROIQ in the last 72 hours.     No lab exists for component: CPKMB  No results found for: CHOL, CHOLX, CHLST, CHOLV, HDL, HDLP, LDL, LDLC, DLDLP, TGLX, TRIGL, TRIGP, CHHD, CHHDX  Lab Results   Component Value Date/Time    Glucose (POC) 156 (H) 01/13/2022 06:22 PM    Glucose (POC) 148 (H) 01/13/2022 04:28 PM    Glucose (POC) 165 (H) 01/13/2022 11:07 AM    Glucose (POC) 147 (H) 01/13/2022 06:29 AM    Glucose (POC) 179 (H) 01/12/2022 09:40 PM     Lab Results   Component Value Date/Time    Color PINK 01/10/2022 03:30 AM    Appearance CLOUDY (A) 01/10/2022 03:30 AM    Specific gravity 1.025 01/10/2022 03:30 AM    pH (UA) 6.0 01/10/2022 03:30 AM    Protein TRACE (A) 01/10/2022 03:30 AM    Glucose Negative 01/10/2022 03:30 AM    Ketone TRACE (A) 01/10/2022 03:30 AM    Urobilinogen 1.0 01/10/2022 03:30 AM    Nitrites Negative 01/10/2022 03:30 AM    Leukocyte Esterase TRACE (A) 01/10/2022 03:30 AM    Epithelial cells FEW 01/10/2022 03:30 AM    Bacteria Negative 01/10/2022 03:30 AM    WBC 0-4 01/10/2022 03:30 AM    RBC  01/10/2022 03:30 AM         CODE STATUS:   Full Code    DNR    Partial    Comfort Care       Signed:   Delmi Mcdowell MD  Date of Service:  1/13/2022  7:17 PM

## 2022-01-15 NOTE — PROGRESS NOTES
Day #3 of Vancomycin  Indication:  Sepsis  Current regimen:  500mg q12h  Abx regimen:  levofloxacin (UTI, continued on admission) + Aztreonam + vancomycin    Recent Labs     01/15/22  0119 22  0239 22  1943   WBC 27.0* 15.9* 15.8*   CREA 1.03 0.99 0.92   BUN 13 8 8     Est CrCl: 45-50 ml/min; UO: 0.1 ml/kg/hr  Temp (24hrs), Av.5 °F (36.9 °C), Min:96.6 °F (35.9 °C), Max:100.4 °F (38 °C)    Goal target range AUC/NATALYA 400-600    Recent level history:  Date/Time Dose & Interval Measured Level (mcg/mL) Associated AUC/NATALYA Dose Adjustment    1/15 @ 0119 500 mg IV q12h 11.4 ~ 4.25 hrs p dose 429 No change                                          Plan: No change for now. Repeat level tomorrow.

## 2022-01-15 NOTE — PROGRESS NOTES
[facility]    ICU TEAM Progress Note    Name: Rancho Foreman   : 1950   MRN: [id number]   Date: 1/15/2022      Assessment / Plan:     Cardiac arrest  PEA arrest s/p initiation of ACLS algorithm with ROSC  Echocardiogram pending  Continue to trend troponin. Ordered q6  Follow serial cardiac enzymes / EKG's    Acute respiratory failure  S/p intubation / MV  Ventilator settings optimized  Worsening hypoxia. Possibly related to acute pulmonary edema. Also considered is pulmonary thromboembolic disease  Current PEEP:5-->8, current fiO2:100%  Discontinue IVF hydration. Gentle diuresis - follow bicarbonate/creatinine/eGFR closely  Obtain bilateral lower extremity venous doppler U/S. Defer empiric anticoagulation for now in setting of coagulopathy and concern for acute blood loss anemia      On low dose Precedex and Fentanyl for sedation. Discussed weaning Precedex in favor of fentanyl with nursing staff  Hemodynamic instability precludes liberation from mechanical ventilation    Sepsis with septic shock  Blood and urine cultures NGTD. Lower respiratory tract cultures unremarkable thus far  Peripheral markers of infection: Procalcitonin level mildly elevated, Strep pneumo and Legionella urinary antigens pending  On empiric broad spectrum antibiotic therapy with Vanc, Aztreonam  Adjust antibiotic regimen based on culture results accordingly    Improving hemodynamics. Discontinue aggressive IVF hydration. On vasopressor therapy with Vasopressin, phenylephrine, and norepinephrine infusion - titrate to maintain MAP >65.  Wean norepinephrine in favor of phenylephrine with refractory tachycardia  Continue to trend lactic acid levels to confirm decline  On hydrocortisone for empiric treatment of adrenal insufficiency    Anasarca  Significant 3rd spacing  Albumin infusions with gentle diuresis      Anemia  No clear source of hemorrhage evident at bedside  Transfused 1 unit PRBC's 22 with appropriate response  Continue to follow serial H&H and transfuse as necessary    Liver dysfunction  Coagulopathy  Acute encephalopathy  Toxic / metabolic etiology suspected  There may be a more chronic component after obtaining further history from family  Wernicke's encephalopathy considered by nutrition. More aggressive thiamine supplementation - 500mg TID x 2 days then 250mg daily x 5 days, then 100mg therafter    Acute renal insufficiency  Now with improving urine output with more aggressive IVF hydration    Metabolic acidosis  Non-anion gap / hyperchloremic  Out of proportion to severity of renal insufficiency  No significant diarrhea. Obtain urine pH  Slowly improving  Bicarbonate supplementation    Aspiration pneumonia / pneumonitis    Lactic acidosis          ICU Comprehensive Plan of Care:       F - Feeding: Begin enteral feeds   A - Analgesia:   S - Sedation: Fentanyl and Precedex  T - DVT Prophylaxis: SCD's or Sequential Compression Device for now in setting of anemia concerning for acute blood loss and worsening thrombocytopenia   H - Head of Bed: > 30 Degrees  U - Ulcer Prophylaxis: Protonix (pantoprazole) BID IV  G - Glycemic Control: Insulin  S - Spontaneous Breathing Trial: No  B - Bowel Regimen:  I - Indwelling Catheter:   Tubes: ETT and Nasogastric Tube  Lines: Arterial Line and Central Line  Drains: Mcgraw Catheter      Subjective:     Events of overnight reviewed. Mr. Claudia Guardado remains critically ill, intubated/sedated. Son at bedside    Home Medications:     Prior to Admission medications    Medication Sig Start Date End Date Taking? Authorizing Provider   clopidogreL (PLAVIX) 75 mg tab Take 75 mg by mouth daily. 3/3/21  Yes Other, MD Joseph   ferrous sulfate 325 mg (65 mg iron) tablet Take 1 Tablet by mouth two (2) times a day. 1/15/21  Yes Other, MD Joseph   atenoloL (TENORMIN) 50 mg tablet Take 50 mg by mouth daily.  10/11/21   Other, MD Joseph       Allergies     Allergies   Allergen Reactions    Keflex [Cephalexin] Anaphylaxis        Objective:   Vital Signs:  Visit Vitals  BP (!) 89/56   Pulse (!) 133   Temp 98.8 °F (37.1 °C)   Resp 16   Ht 5' 8\" (1.727 m)   Wt 52 kg (114 lb 10.2 oz)   SpO2 98%   BMI 17.43 kg/m²      O2 Device: Endotracheal tube,Ventilator Temp (24hrs), Av.7 °F (37.1 °C), Min:96.6 °F (35.9 °C), Max:100.4 °F (38 °C)           Intake/Output:     Intake/Output Summary (Last 24 hours) at 1/15/2022 1055  Last data filed at 1/15/2022 1000  Gross per 24 hour   Intake 5058.65 ml   Output 290 ml   Net 4768.65 ml       Physical Exam:  Gen: Intubated / sedated  HEENT:   Head: Temporal wasting  Eyes: No scleral icterus. PERRL   OP: ETT  Neck: Supple. No cervical or supraclavicular lymphadenopathy  Resp: Adequate thoracic excursion. Ventilator associated breath sounds throughout. No overt wheezes or rhonchi. CV: regular rate and rhythm. No murmurs, rubs, or gallops  Abd: Mildly distended. Soft. Non-tender to palpation without rebound / guarding  Ext: No lower extremity edema. Cold distal extremities. Cyanosis of 1st-3rd digits of right lower extremity. More mild cyanosis of left foot  : Urinary catheter in place with more urine of altaf color      LABS AND  DATA: Personally reviewed  Recent Labs     01/15/22  0811 01/15/22  0119 22  1535 22   WBC  --  27.0*  --  15.9*   HGB 8.6* 8.1*   < > 6.2*   HCT 27.5* 26.0*   < > 19.6*   PLT  --  48*  --  65*    < > = values in this interval not displayed.      Recent Labs     01/15/22  0811 01/15/22  0119 22  0239 22  0239 22  1943 22  1943 22  0440 22  0440    139   < > 143   < > 145   < > 142   K 3.7 4.1   < > 4.0   < > 3.7   < > 3.7   * 118*   < > 117*   < > 117*   < > 116*   CO2 17* 14*   < > 18*   < > 22   < > 19*   BUN 12 13   < > 8   < > 8   < > 9   CREA 0.95 1.03   < > 0.99   < > 0.92   < > 0.87   * 360*   < > 157*   < > 178*   < > 149*   CA 7.9* 7.3*   < > 10.2*   < > 12.4*   < > 7.9*   MG  --   --   --  2.5*  --  1.7   < > 1.6   PHOS  --   --   --  2.0*  --   --   --  1.2*    < > = values in this interval not displayed. Recent Labs     01/15/22  0119 01/14/22  0239   AP 26* 39*   TP 3.4* 4.7*   ALB 1.9* 2.6*   GLOB 1.5* 2.1   LPSE  --  <10*     Recent Labs     01/13/22  1943   INR 1.5*   PTP 15.2*   APTT 66.1*      Recent Labs     01/15/22  0822 01/15/22  0448   PHI 7.16* 7.11*   PCO2I 41.9 42.2   PO2I 54* 62*   FIO2I 60 60     No results for input(s): CPK, CKMB, TROIQ, BNPP in the last 72 hours.     Hemodynamics:   PAP:   CO:     Wedge:   CI:     CVP:    SVR:       PVR:       Ventilator Settings:  Mode Rate Tidal Volume Pressure FiO2 PEEP   Assist control,Volume control   480 ml    60 % 5 cm H20     Peak airway pressure: 34 cm H2O    Minute ventilation: 10.6 l/min        MEDICATIONS:  Current Facility-Administered Medications   Medication Dose Route Frequency    phytonadione (vitamin K1) (AQUA-MEPHYTON) 10 mg in 0.9% sodium chloride 50 mL IVPB  10 mg IntraVENous DAILY    dextrose 5% and 0.9% NaCl infusion  100 mL/hr IntraVENous CONTINUOUS    thiamine (B-1) 500 mg in 0.9% sodium chloride 50 mL IVPB  500 mg IntraVENous Q8H    balsam peru-castor oiL (VENELEX) ointment   Topical TID    PHENYLephrine (SANCHEZ-SYNEPHRINE) 30 mg in 0.9% sodium chloride 250 mL infusion   mcg/min IntraVENous TITRATE    hydrocortisone Sod Succ (PF) (SOLU-CORTEF) injection 50 mg  50 mg IntraVENous Q6H    guaiFENesin ER (MUCINEX) tablet 600 mg  600 mg Oral Q12H    Vancomycin - Pharmacy dosing   Other Rx Dosing/Monitoring    vancomycin (VANCOCIN) 500 mg in 0.9% sodium chloride (MBP/ADV) 100 mL MBP  500 mg IntraVENous Q12H    aztreonam (AZACTAM) 2 g in sterile water (preservative free) 10 mL IV syringe  2 g IntraVENous Q8H    [Held by provider] heparin (porcine) injection 5,000 Units  5,000 Units SubCUTAneous Q8H    chlorhexidine (PERIDEX) 0.12 % mouthwash 15 mL  15 mL Oral Q12H    vasopressin (VASOSTRICT) 20 Units in 0.9% sodium chloride 100 mL infusion  0-0.04 Units/min IntraVENous TITRATE    EPINEPHrine (ADRENALIN) 5 mg in 0.9% sodium chloride 250 mL infusion  1-10 mcg/min IntraVENous TITRATE    NOREPINephrine (LEVOPHED) 8 mg in 5% dextrose 250mL (32 mcg/mL) infusion  0.5-30 mcg/min IntraVENous TITRATE    dexmedeTOMidine in 0.9 % NaCl (PRECEDEX) 400 mcg/100 mL (4 mcg/mL) infusion soln  0.1-1.5 mcg/kg/hr IntraVENous TITRATE    fentaNYL (PF) 1,500 mcg/30 mL (50 mcg/mL) infusion  0-200 mcg/hr IntraVENous TITRATE    insulin lispro (HUMALOG) injection   SubCUTAneous Q6H    arformoteroL (BROVANA) neb solution 15 mcg  15 mcg Nebulization BID RT    And    budesonide (PULMICORT) 500 mcg/2 ml nebulizer suspension  500 mcg Nebulization BID RT    pantoprazole (PROTONIX) injection 40 mg  40 mg IntraVENous Q12H    And    sodium chloride (NS) flush 10 mL  10 mL IntraVENous Q12H    cholecalciferol (VITAMIN D3) (400 Units /10 mcg) tablet 400 Units  400 Units Oral DAILY    sodium bicarbonate tablet 650 mg  650 mg Oral TID    potassium chloride SR (KLOR-CON 10) tablet 20 mEq  20 mEq Oral BID    influenza vaccine 2021-22 (6 mos+)(PF) (FLUARIX/FLULAVAL/FLUZONE QUAD) injection 0.5 mL  1 Each IntraMUSCular PRIOR TO DISCHARGE    levoFLOXacin (LEVAQUIN) 250 mg in D5W IVPB  250 mg IntraVENous Q24H    [Held by provider] doxazosin (CARDURA) tablet 2 mg  2 mg Oral DAILY          IMAGING:  Chest X-Ray:  CXR Results  (Last 48 hours)               01/15/22 0444  XR CHEST PORT Final result    Impression:  New pulmonary edema. Emphysema. Narrative:  EXAM: Portable CXR. 0438 hours. COMPARISON: 1/13/2022. INDICATION: Pulmonary infiltrate surveillance       FINDINGS:   The lungs show new pulmonary edema. Lungs are emphysematous. Heart is normal.   New small pleural effusions are likely. There is no pneumothorax. Support lines   remain satisfactory.            01/13/22 2027  XR CHEST PORT Final result Impression:  Intubated with an enteric feeding tube placed. Questionable mild   increase in bibasilar airspace opacities       Narrative:  EXAM: XR CHEST PORT       INDICATION: ett       COMPARISON: 1/13/2022       FINDINGS: A portable AP radiograph of the chest was obtained at 2016 hours. The   patient is on a cardiac monitor. Suggestion of mild patchy increased opacity in   the bilateral lung bases. .. Endotracheal tube is 5.5 cm above the jing. Nasogastric tube courses in the stomach. .  The bones and soft tissues are   grossly within normal limits. 01/13/22 1549  XR CHEST PORT Final result    Impression:  Mild patchy airspace opacity in the right lung base   Interval which may represent pneumonia       Narrative:  EXAM: XR CHEST PORT       INDICATION: crackles       COMPARISON: 1/13/2022       FINDINGS: A portable AP radiograph of the chest was obtained at 1546 hours. The   patient is on a cardiac monitor. Mild patchy airspace opacity in the right lung   base new in the interval.. The cardiac and mediastinal contours and pulmonary   vascularity are normal.  The bones and soft tissues are grossly within normal   limits. NOTE OF PERSONAL INVOLVEMENT IN CARE     Patient with multi-system organ failure / dysfunction that presents a constant threat to life. Critical care time of greater than 50 minutes spent at bedside providing critical care services, in discussion of plan of care with staff at bedside, and in review of available medical records. This is exclusive of procedures. Gilberto BOURGEOISBTonio TAYLOR.      Critical Care Medicine  [facility]

## 2022-01-15 NOTE — PROGRESS NOTES
1700- pt arrived from CCU w/ nurse and RT intubated sedated, bedside handoff complete w/ JEANETTE Llanes. Laura Wilson assumed care.

## 2022-01-15 NOTE — PROGRESS NOTES
1800- Called and spoke to Dr. Nancy Castelan regarding most recent labs and patient status. MD states to increase HCO3 drip to 100ml/hr, redraw BMP at 2200, and titrate Ollie to a max of 300 for goal of MAP >65    1900- Dual Skin Performed. Patient has macerated, bleeding sacrum, multiple wounds on sacrum, bilateral arms, areas of non-blanching on arms, lips, and legs. 2000- Bedside and Verbal shift change report given to ALAYNA RN (oncoming nurse) by Anish William RN (offgoing nurse). Report given with SBAR, Kardex, Intake/Output and MAR.

## 2022-01-15 NOTE — PROGRESS NOTES
0730 Bedside shift change report given to Corky Lara (oncoming nurse) by [facility] (offgoing nurse). Report included the following information SBAR, Kardex, Procedure Summary, Intake/Output, MAR, Recent Results, Cardiac Rhythm Sinus Tach and Alarm Parameters . 0800 Handoff received. Drips verified. Jasen Paulson at bedside. Neuro assessment complete, will discontinue restraints. Pt eyes open, pupils rolled back into head, cannot assess pupillary response. Pt withdraws to pain, reflexively flexes hands and legs. Temp WNL, cesar hugger removed. Labs drawn and sent, up to 100% on vent for pO2 50s. Murmur + respiratory rub. Page out to Echo for confirmation of completion. 1000 Tolerating pressor wean, able to successfully turn and reposition. HR decreasing, temp dropped to 36, cesar hugger replaced. Electrolyte repletion per MD.    1200 Albumin + lasix given. Sudden drop in BP post bicarb push (similar to last night per RN) with subsequent recovery without intervention. Awaiting bicarb gtt from pharmacy. 1400 Continuing to wean Levo in favor of Ollie, slower since lasix push. Will swap to concentrated pressor drips when current bags go dry. See MAR for titrations. 1600 Labs drawn + sent, report called to Blaise in CVI for lateral transfer. Urine output increased, clear and yellow. MD made aware. TRANSFER - OUT REPORT:    Verbal report given to Patricia (name) on Ross Mcneill  being transferred to CVI (unit) for routine progression of care       Report consisted of patients Situation, Background, Assessment and   Recommendations(SBAR). Information from the following report(s) Procedure Summary, Intake/Output, MAR, Recent Results and Cardiac Rhythm Sinus Tach 118 was reviewed with the receiving nurse. Lines:   Quad Lumen 01/13/22 Anterior; Left Subclavian (Active)   Central Line Being Utilized Yes 01/15/22 1700   Criteria for Appropriate Use Limited/no vessel suitable for conventional peripheral access 01/15/22 1700   Site Assessment Clean, dry, & intact 01/15/22 1700   Infiltration Assessment 0 01/15/22 1700   Affected Extremity/Extremities Color distal to insertion site pink (or appropriate for race); Pulses palpable;Range of motion performed 01/15/22 1700   Date of Last Dressing Change 01/13/22 01/15/22 0600   Dressing Status Clean, dry, & intact 01/15/22 1700   Dressing Type Transparent;Disk with Chlorhexadine gluconate (CHG) 01/15/22 1700   Action Taken Open ports on tubing capped 01/15/22 1700   Proximal Hub Color/Line Status White; Infusing 01/15/22 1700   Positive Blood Return (Medial Site) Yes 01/15/22 0600   Medial 1 Hub Color/Line Status Aguilar Loud; Infusing 01/15/22 1700   Positive Blood Return (Lateral Site) Yes 01/15/22 0600   Medial 2 Hub Color/Line Status Blue; Infusing 01/15/22 1700   Positive Blood Return (Site #3) Yes 01/15/22 0600   Distal Hub Color/Line Status Brown 01/15/22 1700   Positive Blood Return (Site #4) Yes 01/15/22 0600   Alcohol Cap Used Yes 01/15/22 0400       Peripheral IV 01/11/22 Anterior; Left Forearm (Active)   Site Assessment Ecchymotic (bruised) 01/15/22 0600   Phlebitis Assessment 0 01/15/22 0600   Infiltration Assessment 0 01/15/22 0600   Dressing Status Intact 01/15/22 0600   Dressing Type Transparent;Tape 01/15/22 0600   Hub Color/Line Status Blue;Capped 01/15/22 0600   Action Taken Open ports on tubing capped 01/15/22 0600   Alcohol Cap Used Yes 01/15/22 0600       Arterial Line 01/13/22 Right Other (comment) (Active)   Site Assessment Bleeding;Ecchymotic (bruised); Other (Comment) 01/15/22 1700   Dressing Status Old drainage 01/15/22 1700   Dressing Type Transparent;Disk with Chlorhexadine gluconate (CHG) 01/15/22 1700   Line Status Intact and in place 01/15/22 1700   Treatment Zeroed or re-zeroed 01/15/22 1700   Affected Extremity/Extremities Color distal to insertion site pink (or appropriate for race); Pulses palpable;Range of motion performed 01/15/22 1700        Opportunity for questions and clarification was provided.       Patient transported with:   Monitor  O2 @ 15 liters  Patient-specific medications from Pharmacy  Registered Nurse  Tech    Azactam, vanc, levaquin, thiamine, venelex, + peridex sent w/ patient

## 2022-01-16 NOTE — PROGRESS NOTES
0015: ICU Charge RN spoke with Radhames Lagos (son) to notify him of the patient's transfer to ICU. Mr. Amita Huitron also informed of patient's COVID positive status. Updated Mr. Noriega on COVID patient visitation - including availability of Zoom calls and end of life visitation. Offered Mr. Noriega phone number to ICU nurses' station; Mr. Amita Huitron declined this and stated he would call back in the morning.

## 2022-01-16 NOTE — DISCHARGE SUMMARY
Admit date: 2022   Admitting Provider: Katheryn Werner DO    Discharge date: 2022  Discharging Provider: Keshawn Ibarra MD patient is . Time of death 12:10 PM      * Admission Diagnoses: Severe sepsis ([address] [A41.9, R65.20]    * Discharge Diagnoses:    Septic shock:   Acute hypoxic respiratory failure   COVID-19 pneumonia   Acute kidney injury   Encephalopathy    * Hospital Course:   78-year-old gentleman with chronic history of alcohol abuse who was admitted through the ER on January 10 with severe acidosis thought to be related to undertreated UTI.  Patient reportedly had some confusion at this time however his exam was nonfocal and seems to be improving per documentation. On  primary hospitalist consulted ICU as he is concerned that his mental status is worsening and that his oxygen level is decreasing though remained adequate especially in setting of right lower lobe infiltrate on x-ray.  Before ICU evaluated the patient he went immediately down to get the CT chest and then CAT scan he sustained cardiac arrest and ROSC was achieved after about 7 minutes [please see code note for details]  Patient remains critically ill. Continue to require high levels of pressors. Broad-spectrum antibiotic was started. COVID-19 was found to be positive on 15 January. Unfortunately patient condition continued to worsen. He is requiring very high doses of pressors. Developed limb ischemia in 4 limbs. Remains poorly responsive despite being off sedation. Family discussion took place and all his children agreed to transition to comfort measures.   He passed away peacefully at 12:10 PM with his family at bedside    Discharge Exam:  No pulse, no respiratory effort, pupils fixed dilated    * Discharge Condition:     * Disposition: Roberto      Signed:  Keshawn Ibarra MD  2022  12:38 PM

## 2022-01-16 NOTE — PROGRESS NOTES
2000: MD Maria E Wade at bedside, Reviewed patient status with oncoming RN and offgoing RN. Orders received to not titrate levo, hold lasix at this time, Vent rate increased to 20 from 16. Plan to recheck labs and ABGs at 2200.    2145: RN noted neuro assessment differs from reported neuro status. No response noted to painful stimuli, pupils sluggish, differ in size R>L. L eye deviated upward. MD notified and will come to bedside. 2155: MD at bedside and neuro status assessment completed. Orders received to camille/erika becker. MD to consult neuro NP in ICU.     2200: Covid rapid test and PCR sent to lab per unit protocol. 2209: ABGs resulted:    7.151, 32.1, 124.4, 11.0    2215: RT at bedside, resp rate increased to 24. MD notified of result and changes to vent. 2318: Neuro NP at bedside to assess patient, CT ordered. MD confirmed plan to travel to CT on the way to MICU. 2330: Critical lab value received, PLT 36, MD notified. 2238: Lab called RN to notify of positive covid test. MD notified, plan to transfer patient out of CVICU. 2240: Patient Placed on isolation, droplet plus precautions. 2343: Patient taken to CT on the way to MICU. Patient transported with 3 RNs, RT, PCT, Monitor, and emergency meds. 0015: Patient arrives to MICU. ICU RN assumed care of patient.

## 2022-01-16 NOTE — PROGRESS NOTES
Day #4 of Vancomycin  Indication:  Sepsis  Current regimen:  500mg q12h    Recent Labs     22  0341 01/15/22  2213 01/15/22  1650 01/15/22  0811 01/15/22  0119 22  0239 22  023   WBC  --  32.2*  --   --  27.0*  --  15.9*   CREA 1.42* 1.26 1.15   < > 1.03   < > 0.99   BUN 16 16 14   < > 13   < > 8    < > = values in this interval not displayed. Est CrCl: 35-40 ml/min; UO: 0.4 ml/kg/hr  Temp (24hrs), Av.4 °F (35.8 °C), Min:95 °F (35 °C), Max:97.9 °F (36.6 °C)    Goal target range AUC/NATALYA 400-600    Recent level history:  Date/Time Dose & Interval Measured Level (mcg/mL) Associated AUC/NATALYA Dose Adjustment    1/15 @ 0119 500 mg IV q12h 11.4 ~ 4.25 hrs p dose 429 No change                                        Plan: Scr significantly changed this am. Hold further doses for now and check a random level with am labs tomorrow ~ 20 hours after last dose. Calculations suggesting 750 mg IV q24 hours for predicted , trough 14. May need to dose by levels instead.

## 2022-01-16 NOTE — PROGRESS NOTES
Neurology Progress Note  Chadwick Garcia NP    Admit Date: 1/9/2022   LOS: 7 days      Daily Progress Note: 1/16/2022    S/P:     HPI: Kimmie Christine is a 80 y.o. M with a past medical history of ETOH use disorder, HTN, who was admitted to the hospital on 01/09/22 for altered mental status. He was found to have sepsis, dehydration, RI, episodic hypoglycemia and hyponatremia. He was tested for COVID at that time and was negative. Neurology was asked to see him on 01/12 due to increased altered mental status, but patient's mental status had improved again and it was felt that he was having fluctuations in mental status due to metabolic derangements. Patient's clinical course declined and he coded in CT scan while getting CTA of the chest. He was down for 7 minutes. Patient has been intubated since then. Overnight on 01/15 nursing notified the Intensivist that the patient was no longer withdrawing to pain and had a dysconjugate gaze with left eye upward deviation. Patient was incidentally found positive with COVID at that time. Subjective:   Asked to see patient again as he is no longer withdrawing from pain. Patient taken for [facility] with was negative for acute process and patient moved to ICU. Patient critically ill on 3 pressors. Allergies   Allergen Reactions    Keflex [Cephalexin] Anaphylaxis       Review of Systems:  Review of systems not obtained due to patient factors. Intubated, unresponsive. Past Medical History:   Diagnosis Date    ETOH abuse     Hypertension     UTI (urinary tract infection)      History reviewed. No pertinent family history. Social History     Tobacco Use    Smoking status: Current Every Day Smoker    Smokeless tobacco: Not on file   Substance Use Topics    Alcohol use: Yes      Prior to Admission Medications   Prescriptions Last Dose Informant Patient Reported? Taking?   atenoloL (TENORMIN) 50 mg tablet   Yes No   Sig: Take 50 mg by mouth daily.    clopidogreL (PLAVIX) 75 mg tab   Yes Yes   Sig: Take 75 mg by mouth daily. ferrous sulfate 325 mg (65 mg iron) tablet   Yes Yes   Sig: Take 1 Tablet by mouth two (2) times a day. Facility-Administered Medications: None       Objective:   Vital signs  Temp (24hrs), Av.2 °F (36.2 °C), Min:96.4 °F (35.8 °C), Max:98.8 °F (37.1 °C)   01/15 1901 -  0700  In: 1037.6 [I.V.:1037.6]  Out: 125 [Urine:125]  701 - 01/15 1900  In: 7750.2 [I.V.:7275.4]  Out: 505 [Urine:505]  Visit Vitals  BP (!) 89/60 (BP 1 Location: Right upper arm, BP Patient Position: At rest)   Pulse (!) 127   Temp (!) 96.6 °F (35.9 °C)   Resp 25   Ht 5' 8\" (1.727 m)   Wt 52 kg (114 lb 10.2 oz)   SpO2 98%   BMI 17.43 kg/m²      O2 Device: Endotracheal tube,Ventilator   Vitals:    01/15/22 2300 01/15/22 2331 22 0030 22 0100   BP:       Pulse: (!) 121 (!) 120 (!) 123 (!) 127   Resp: 24 24 24 25   Temp:   (!) 96.6 °F (35.9 °C)    SpO2:       Weight:       Height:            Physical Exam:  GENERAL: Critically ill appearing male  SKIN: Warm, dry. Mottled blue feet and hands due to being on multiple pressors. Neurologic Exam:  Mental Status:  No eye opening, no command following. Language:    Mute, ETT in place. Cranial Nerves:   Pupils 4 mm, equal, round andsluggish     No blink to threat, no cough or gag. Motor:    No spontaneous or purposeful movements, no involuntary movements. Sensation:    No withdrawal to noxious stimuli.    Reflexes:    [employer]      Labs:  Lab Results   Component Value Date/Time    WBC 32.2 (H) 01/15/2022 10:13 PM    HGB 7.0 (L) 01/15/2022 10:13 PM    HCT 23.0 (L) 01/15/2022 10:13 PM    PLATELET 36 (LL)  10:13 PM    .5 (H) 01/15/2022 10:13 PM     Lab Results   Component Value Date/Time    Sodium 143 01/15/2022 10:13 PM    Potassium 5.0 01/15/2022 10:13 PM    Chloride 116 (H) 01/15/2022 10:13 PM    CO2 14 (LL) 01/15/2022 10:13 PM    Anion gap 13 01/15/2022 10:13 PM Glucose 142 (H) 01/15/2022 10:13 PM    BUN 16 01/15/2022 10:13 PM    Creatinine 1.26 01/15/2022 10:13 PM    BUN/Creatinine ratio 13 01/15/2022 10:13 PM    GFR est AA >60 01/15/2022 10:13 PM    GFR est non-AA 57 (L) 01/15/2022 10:13 PM    Calcium 7.7 (L) 01/15/2022 10:13 PM     Imaging:  CT Results (maximum last 3): Results from [place] encounter on 01/09/22    CT HEAD WO CONT    Narrative  EXAM: CT HEAD WO CONT    INDICATION: Neurologic changes    COMPARISON: None. CONTRAST: None. TECHNIQUE: Unenhanced CT of the head was performed using 5 mm images. Brain and  bone windows were generated. Coronal and sagittal reformats. CT dose reduction  was achieved through use of a standardized protocol tailored for this  examination and automatic exposure control for dose modulation. FINDINGS:  The ventricles and sulci are prominent but symmetric in size, shape and  configuration. The cortical sulci are similarly prominent. There is mild  confluent periventricular white matter hypodensity. There is no significant  white matter disease. There is no intracranial hemorrhage, extra-axial  collection, or mass effect. The basilar cisterns are open. No CT evidence of  acute infarct. The bone windows demonstrate no abnormalities. The visualized portions of the  paranasal sinuses and mastoid air cells are clear. Impression  No acute intracranial process seen. Underlying atrophy and small vessel ischemic disease. CT CHEST WO CONT    Narrative  INDICATION: Shortness of breath. Pneumonia. COMPARISON: Radiograph dated 1/13/2022    CONTRAST: None. TECHNIQUE:  5 mm axial images were obtained through the chest. Coronal and  sagittal reformats were generated. CT dose reduction was achieved through use  of a standardized protocol tailored for this examination and automatic exposure  control for dose modulation.     The absence of intravenous contrast reduces the sensitivity for evaluation of  the mediastinum, melina, vasculature, and upper abdominal organs. FINDINGS:    CHEST WALL: No axillary or supraclavicular adenopathy. THYROID: No nodule. MEDIASTINUM: No mass or lymphadenopathy. MELINA: No mass or lymphadenopathy. THORACIC AORTA: No aneurysm. MAIN PULMONARY ARTERY: Unremarkable  TRACHEA/BRONCHI: Patent. ESOPHAGUS: No wall thickening or dilatation. HEART: Three-vessel coronary atherosclerotic disease  PLEURA: Bilateral pleural effusions right greater than left  LUNGS: Emphysema. 9 mm pleural-based nodule right upper lobe posteriorly series  4 image 18. Patchy airspace opacities and centrilobular nodules in the right  lower lobe and left lower lobe extending from the melina peripherally. INCIDENTALLY IMAGED UPPER ABDOMEN: No significant abnormality in the  incidentally imaged upper abdomen. BONES: Cervical fusion hardware. Otherwise unremarkable    Impression  1. Patchy bilateral lower lobe airspace opacities and centrilobular nodules  extending from the melina peripherally. Findings may represent aspiration or  pneumonia. 2.  Small bilateral pleural effusions right greater than left. 3.  9 mm pleural-based nodular opacity in the right upper lobe. Consider  follow-up imaging given the underlying emphysema. Follow-up chest CT can be  obtained in 6 months    Assessment:   Active Problems:    Severe sepsis ([address] (0/1/7779)      Metabolic encephalopathy (1/56/0302)      Plan:   1.) Altered mental status/Encephalopathy. Considerations include medication side effects,  sepsis, systemic infections, possible metabolic derangements, seizures, structural or infectious processes of the brain. Patient found to be COVID-19 positive on 01/15/22. WBC's elevated to 32.2, hgb 7, hct 23, plt 36. Ammonia elevated at 44.      -stat CT head 01/16/22 shows no acute intracranial process.  Underlying atrophy and small  vessel ischemic disease.    -Consider MRI when stable enough to rule out CVA, structural or infectious processes. -EEG to rule out nonconvulsive status epilepticus   -Supportive care per Intensivist   -Frequent neuro checks per unit protocol   -Further recommendations to follow          Further recommendations to follow from Dr. Almaz Worthington. Yolanda Barker NP  Neurocritical Care Nurse Practitioner      Neurology staff:    I examined the patient at the bedside. I discussed the case and agree with the plans and documentation above from NP Princess Castellon. Mr. Javan Linton is a new patient for me. He was seen by neurology on January 12 for mental status changes thought to be toxic metabolic in origin. Neurology reconsulted because of recurrent mental status changes. During this period, he suffered cardiac arrest while getting imaging completed of the chest.  ROSC after 7 minutes. He was intubated. Some concern for seizure-like activity with gaze deviation. COVID-positive now. On exam, there is no sedation. He has anisocoria left greater than right by about 2-3 mm with trace reactivity at best.  No doll's eyes, no corneal reflexes. No withdrawal to pain in the extremity. No abnormal movements. 60-year-old gentleman who has been having recurrent toxic metabolic encephalopathy who then suffered cardiac arrest requiring resuscitation. Also COVID-positive now. On exam, he seems to be approaching brain death with the exception of some minuscule trace reactivity of the pupils but they are unequal.  Suspicion for anoxic injury during cardiac arrest.  I am aware that there is some discussion about comfort care. I will follow-up if the patient remains an inpatient.   [address] [place] [address],   Neurologist  Diplomate, American Board of Psychiatry and Neurology  Board Certified, Adult Neurology and Brain Injury Medicine

## 2022-01-16 NOTE — PROGRESS NOTES
Brief progress note:    Notified by nursing of inconsistent neurologic exam. On my evaluation pt with equal and sluggishly reactive pupils - 4mm. Slight left eye upward deviation. W/d to pain in RUE. +cough and gag. Corneals not tested. Requested RN to pause fentanyl gtt (only sedative/analgesic on board - prior precedex paused >24 hours prior). Discussed case with neurology NP on overnight as they had previously been consulted prior to his cardiac arrest. Around this time, pt found to be incidentally + for COVID. This obviously raises probability for stroke. He additionally has increased probability for Wernicke's encephalopathy and complex alcohol withdrawl. Will send for head CT and likely EEG after transfer to MICU. Shock state continues - SB gtt increased and RR increased to assist in pH mgmt.      Joanna Brooks MD  1/15/2022  11:53 PM

## 2022-01-16 NOTE — PROGRESS NOTES
2340: TRANSFER - IN REPORT:    Verbal report received from [facility] RN(name) on Giacomo Larsen  being received from CVICU(unit) for routine progression of care      Report consisted of patients Situation, Background, Assessment and   Recommendations(SBAR). Information from the following report(s) SBAR, Kardex, ED Summary, Intake/Output, MAR, Recent Results, and Cardiac Rhythm sinus tachy  was reviewed with the receiving nurse. Opportunity for questions and clarification was provided. Assessment completed upon patients arrival to unit and care assumed. Helped transport pt to CT.    0030: Pt on unit. Primary Nurse Tiny Srivastava RN and Autumn Galeana RN performed a dual skin assessment on this patient Impairment noted- see wound doc flow sheet  Hector arms weeping. Noted the sacral wound and right arm tear. All four extremities cool and purple. Scrotum edematous and bleeding. Mouth bleeding. Virgil score is 7    0100: Unable to do pupillometer as we do not have the cover in the supplies. Will do readings once we have the cover.

## 2022-01-16 NOTE — PROGRESS NOTES
[facility]    ICU TEAM Progress Note    Name: Cosmo Garcia   : 1950   MRN: [id number]   Date: 2022      I  Subjective:   Progress Note: 2022      Reason for ICU Admission: Post cardiac arrest, septic shock, encephalopathy, respiratory failure    Interval history:  26-year-old gentleman with chronic history of alcohol abuse who was admitted through the ER on January 10 with severe acidosis thought to be related to undertreated UTI. Patient reportedly had some confusion at this time however his exam was nonfocal and seems to be improving per documentation. On  primary hospitalist consulted ICU as he is concerned that his mental status is worsening and that his oxygen level is decreasing though remained adequate especially in setting of right lower lobe infiltrate on x-ray. Before ICU evaluated the patient he went immediately down to get the CT chest and then CAT scan he sustained cardiac arrest and ROSC was achieved after about 7 minutes [please see code note for details]  Patient remains critically ill. Continue to require high levels of pressors. Broad-spectrum antibiotic was started. COVID-19 was found to be positive on 15 January. Overnight Events:   : Questionable change in neurological status. Remains critically ill on multiple pressors. COVID-19 positive. Worsening respiratory status. Worsening lactic acidosis    Active Problem List:     Problem List  Never Reviewed          Codes Class    Metabolic encephalopathy XVQ-35-LW: G93.41  ICD-9-CM: 348.31         Severe sepsis (HCC) ICD-10-CM: A41.9, R65.20  ICD-9-CM: 038.9, 995.92               Past Medical History:      has a past medical history of ETOH abuse, Hypertension, and UTI (urinary tract infection).     Past Surgical History:      has a past surgical history that includes hx orthopaedic; hx orthopaedic; and hx other surgical.    Home Medications:     Prior to Admission medications    Medication Sig Start Date End Date Taking? Authorizing Provider   clopidogreL (PLAVIX) 75 mg tab Take 75 mg by mouth daily. 3/3/21  Yes Other, MD Joseph   ferrous sulfate 325 mg (65 mg iron) tablet Take 1 Tablet by mouth two (2) times a day. 1/15/21  Yes Other, MD Joseph   atenoloL (TENORMIN) 50 mg tablet Take 50 mg by mouth daily. 10/11/21   Other, MD Joseph       Allergies/Social/Family History: Allergies   Allergen Reactions    Keflex [Cephalexin] Anaphylaxis      Social History     Tobacco Use    Smoking status: Current Every Day Smoker    Smokeless tobacco: Not on file   Substance Use Topics    Alcohol use: Yes      History reviewed. No pertinent family history. Review of Systems:     Not able to obtain due to patient medical condition    Objective:   Vital Signs:  Visit Vitals  BP (!) 89/60 (BP 1 Location: Right upper arm, BP Patient Position: At rest)   Pulse (!) 125   Temp (!) 95 °F (35 °C)   Resp 24   Ht 5' 8\" (1.727 m)   Wt 55.8 kg (123 lb 0.3 oz)   SpO2 98%   BMI 18.70 kg/m²      O2 Device: Endotracheal tube,Ventilator Temp (24hrs), Av.4 °F (35.8 °C), Min:95 °F (35 °C), Max:97.9 °F (36.6 °C)           Intake/Output:     Intake/Output Summary (Last 24 hours) at 2022 0802  Last data filed at 2022 0700  Gross per 24 hour   Intake 4729 ml   Output 393 ml   Net 4336 ml       Physical Exam:    Gen: Intubated  HEENT:   Head: Temporal wasting  Eyes: No scleral icterus. PERRL   OP: ETT  Neck: Supple. No cervical or supraclavicular lymphadenopathy  Resp: Adequate thoracic excursion. Ventilator associated breath sounds throughout. No overt wheezes or rhonchi. CV: regular rate and rhythm. No murmurs, rubs, or gallops  Abd: Mildly distended. Soft. Non-tender to palpation without rebound / guarding  Ext: Could not appreciate pulses.   Significant discoloration of 4 limbs more on bilateral lower limb seems to be worsening  : Urinary catheter in place   Neuro: Off sedation, not responsive, gag reflex present. Spontaneous breathing above ventilator present. LABS AND  DATA: Personally reviewed  Recent Labs     01/16/22  0341 01/15/22  2213 01/15/22  0811 01/15/22  0119   WBC  --  32.2*  --  27.0*   HGB 6.4* 7.0*   < > 8.1*   HCT 21.0* 23.0*   < > 26.0*   PLT  --  36*  --  48*    < > = values in this interval not displayed. Recent Labs     01/16/22  0341 01/15/22  2213 01/15/22  1650 01/15/22  1650 01/15/22  0119 01/14/22  0239    143   < > 142   < > 143   K 4.5 5.0   < > 4.7   < > 4.0   * 116*   < > 116*   < > 117*   CO2 13* 14*   < > 15*   < > 18*   BUN 16 16   < > 14   < > 8   CREA 1.42* 1.26   < > 1.15   < > 0.99   * 142*   < > 142*   < > 157*   CA 7.6* 7.7*   < > 8.5   < > 10.2*   MG  --   --   --  1.4*  --  2.5*   PHOS  --   --   --  5.2*  --  2.0*    < > = values in this interval not displayed. Recent Labs     01/15/22  2213 01/15/22  1650 01/15/22  0119 01/14/22  0239   AP 28* 31*   < > 39*   TP 3.8* 3.8*   < > 4.7*   ALB 2.6* 2.3*   < > 2.6*   GLOB 1.2* 1.5*   < > 2.1   LPSE  --   --   --  <10*    < > = values in this interval not displayed. Recent Labs     01/13/22  1943   INR 1.5*   PTP 15.2*   APTT 66.1*      Recent Labs     01/15/22  1221 01/15/22  0822   PHI 7.14* 7.16*   PCO2I 39.5 41.9   PO2I 86 54*   FIO2I 100 60     No results for input(s): CPK, CKMB, TROIQ, BNPP in the last 72 hours. Hemodynamics:   PAP:   CO:     Wedge:   CI:     CVP:    SVR:       PVR:       Ventilator Settings:  Mode Rate Tidal Volume Pressure FiO2 PEEP   Assist control,Volume control   480 ml    100 % (S) 8 cm H20 (found on, time of change unknown, RT not notified)     Peak airway pressure: 35 cm H2O    Minute ventilation: 11.8 l/min        MEDS: Reviewed    Chest X-Ray:  CXR Results  (Last 48 hours)               01/16/22 0339  XR CHEST PORT Final result    Impression:  Increased bilateral upper lobe pulmonary densities with other   findings stable.        Narrative:  EXAM: XR CHEST PORT INDICATION: Pulmonary infiltrate surveillance       COMPARISON: 1/15/2022       FINDINGS: A portable AP radiograph of the chest was obtained at 0320 hours. Left   subclavian line, endotracheal tube and transesophageal tube are unchanged in   position. Severe bilateral pulmonary opacifications are again shown increased   somewhat in the upper lobes bilaterally. Hilar contours and the pulmonary   vascular pattern remain obscured. No pneumothorax is shown. Trace bilateral   pleural effusions are again noted. Cardiac and mediastinal contours are stable. 01/15/22 0444  XR CHEST PORT Final result    Impression:  New pulmonary edema. Emphysema. Narrative:  EXAM: Portable CXR. 0438 hours. COMPARISON: 1/13/2022. INDICATION: Pulmonary infiltrate surveillance       FINDINGS:   The lungs show new pulmonary edema. Lungs are emphysematous. Heart is normal.   New small pleural effusions are likely. There is no pneumothorax. Support lines   remain satisfactory. ECHO:  Pending report    Assessment and Plan:   Cardiac arrest  PEA arrest s/p initiation of ACLS algorithm with ROSC on January 13  Etiology not clear but likely due to hypoxia    Peripheral hypoperfusion with limbs ischemia:  Likely related to systemic hypotension and high pressure as needed. Embolic/thrombotic event possibility especially with COVID-positive. Unfortunately unable to start systemic heparin given the low platelet. Check DIC panel.     Acute respiratory failure  S/p intubation / MV on January 13  Ventilator settings optimized  Worsening hypoxia. Possibly related to progressive COVID-19 infection   Obtain bilateral lower extremity venous doppler U/S. Defer empiric anticoagulation for now in setting of coagulopathy and concern for acute blood loss anemia      Sepsis with septic shock  Blood and urine cultures NGTD.  Lower respiratory tract cultures unremarkable thus far  Peripheral markers of infection: Procalcitonin level mildly elevated, Strep pneumo and Legionella urinary antigens pending  On empiric broad spectrum antibiotic therapy with Vanc, Aztreonam,  Adjust antibiotic regimen based on culture results accordingly     On vasopressor therapy with Vasopressin, phenylephrine, and norepinephrine infusion - titrate to maintain MAP >65. Wean norepinephrine in favor of phenylephrine with refractory tachycardia  Lactic acid trending up  On hydrocortisone for empiric treatment of adrenal insufficiency     Anasarca  Significant 3rd spacing  Albumin infusions       Anemia  No clear source of hemorrhage evident at bedside  Transfused 1 unit PRBC's 1/14/22 with appropriate response. Transfuse another unit on January 16  Continue to follow serial H&H and transfuse as necessary     Liver dysfunction  Coagulopathy  Thrombocytopenia  Acute encephalopathy  Toxic / metabolic etiology suspected  There may be a more chronic component after obtaining further history from family  Wernicke's encephalopathy considered by nutrition. More aggressive thiamine supplementation - 500mg TID x 2 days then 250mg daily x 5 days, then 100mg therafter     Acute renal insufficiency  Worsening, now on bicarb drip. Continue to monitor closely. No immediate indication for emergent hemodialysis at this time     Metabolic acidosis  Likely related to lactic acidosis. Perhaps component of renal failure. Currently on bicarb drip    I talked with her son Toyin Stewart over the phone. I discussed the current condition and the new events overnight. I made it clear that his father and a very critical condition and he may not survive the current situation. Mr. Marylin Hernandez expressed understanding. He stated that he will talk with his brother and sister and they will address goals of care. He felt it may be reasonable to switch him to comfort measures.       Addendum:  History of stones and 1 daughter arrived at bedside.   They all agreed on transitioning to comfort measures. They visited with him. I ordered low-dose morphine and pressure was discontinued and he was extubated. He passed away peacefully with his family at bedside within few minutes. DISPOSITION  Stay in ICU    CRITICAL CARE CONSULTANT NOTE  I had a face to face encounter with the patient, reviewed and interpreted patient data including clinical events, labs, images, vital signs, I/O's, and examined patient. I have discussed the case and the plan and management of the patient's care with the consulting services, the bedside nurses and the respiratory therapist.      NOTE OF PERSONAL INVOLVEMENT IN CARE   This patient has a high probability of imminent, clinically significant deterioration, which requires the highest level of preparedness to intervene urgently. I participated in the decision-making and personally managed or directed the management of the following life and organ supporting interventions that required my frequent assessment to treat or prevent imminent deterioration. I personally spent 75 minutes of critical care time. This is time spent at this critically ill patient's bedside actively involved in patient care as well as the coordination of care and discussions with the patient's family. This does not include any procedural time which has been billed separately. Doug Wang M.D.   Staff Intensivist/Pulmonologist  [facility]  1/16/2022

## 2022-01-16 NOTE — PROGRESS NOTES
Paged by nurse and informed of pt's death. Family had been present earlier, but declined  support when offered.       Kimberly Lazo , [facility]  [phone]

## 2022-01-17 LAB
ABO + RH BLD: NORMAL
BLD PROD TYP BPU: NORMAL
BLD PROD TYP BPU: NORMAL
BLOOD GROUP ANTIBODIES SERPL: NORMAL
BPU ID: NORMAL
BPU ID: NORMAL
CROSSMATCH RESULT,%XM: NORMAL
CROSSMATCH RESULT,%XM: NORMAL
SPECIMEN EXP DATE BLD: NORMAL
STATUS OF UNIT,%ST: NORMAL
STATUS OF UNIT,%ST: NORMAL
UNIT DIVISION, %UDIV: 0
UNIT DIVISION, %UDIV: 0

## 2022-01-18 LAB
APTT PPP: >139 SEC (ref 24–33)
BACTERIA SPEC CULT: NORMAL
D-DIMER, 115188: 3.85 MG/L FEU (ref 0–0.49)
FIBRINOGEN ACTIVITY, 001610: 72 MG/DL (ref 193–507)
FSP PPP LA-ACNC: 5 UG/ML
INR PPP: 3.2 (ref 0.9–1.2)
L PNEUMO1 AG UR QL IA: NEGATIVE
MORPHOLOGY BLD-IMP: NORMAL
PLATELET # BLD AUTO: 24 X10E3/UL (ref 150–450)
PROTHROMBIN TIME: 31.7 SEC (ref 9.1–12)
SERVICE CMNT-IMP: NORMAL
SPECIMEN SOURCE: NORMAL